# Patient Record
Sex: FEMALE | Race: BLACK OR AFRICAN AMERICAN | NOT HISPANIC OR LATINO | Employment: UNEMPLOYED | RURAL
[De-identification: names, ages, dates, MRNs, and addresses within clinical notes are randomized per-mention and may not be internally consistent; named-entity substitution may affect disease eponyms.]

---

## 2022-07-21 ENCOUNTER — INITIAL PRENATAL (OUTPATIENT)
Dept: OBSTETRICS AND GYNECOLOGY | Facility: CLINIC | Age: 27
End: 2022-07-21

## 2022-07-21 ENCOUNTER — LAB (OUTPATIENT)
Dept: LAB | Facility: HOSPITAL | Age: 27
End: 2022-07-21

## 2022-07-21 VITALS — SYSTOLIC BLOOD PRESSURE: 142 MMHG | WEIGHT: 164.8 LBS | DIASTOLIC BLOOD PRESSURE: 92 MMHG

## 2022-07-21 DIAGNOSIS — O16.9 ELEVATED BLOOD PRESSURE AFFECTING PREGNANCY, ANTEPARTUM: ICD-10-CM

## 2022-07-21 DIAGNOSIS — O09.33 INITIAL OBSTETRIC VISIT IN THIRD TRIMESTER: Primary | ICD-10-CM

## 2022-07-21 DIAGNOSIS — Z36.89 ENCOUNTER FOR OTHER SPECIFIED ANTENATAL SCREENING: ICD-10-CM

## 2022-07-21 DIAGNOSIS — Z34.00 SUPERVISION OF NORMAL FIRST PREGNANCY, ANTEPARTUM: ICD-10-CM

## 2022-07-21 DIAGNOSIS — Z78.9 DATE OF LAST MENSTRUAL PERIOD (LMP) UNKNOWN: ICD-10-CM

## 2022-07-21 DIAGNOSIS — Z34.00 SUPERVISION OF NORMAL FIRST PREGNANCY, ANTEPARTUM: Primary | ICD-10-CM

## 2022-07-21 PROCEDURE — 80306 DRUG TEST PRSMV INSTRMNT: CPT | Performed by: NURSE PRACTITIONER

## 2022-07-21 PROCEDURE — 87591 N.GONORRHOEAE DNA AMP PROB: CPT

## 2022-07-21 PROCEDURE — 82570 ASSAY OF URINE CREATININE: CPT | Performed by: NURSE PRACTITIONER

## 2022-07-21 PROCEDURE — 84156 ASSAY OF PROTEIN URINE: CPT | Performed by: NURSE PRACTITIONER

## 2022-07-21 PROCEDURE — 99204 OFFICE O/P NEW MOD 45 MIN: CPT | Performed by: NURSE PRACTITIONER

## 2022-07-21 PROCEDURE — 83036 HEMOGLOBIN GLYCOSYLATED A1C: CPT | Performed by: NURSE PRACTITIONER

## 2022-07-21 PROCEDURE — 80053 COMPREHEN METABOLIC PANEL: CPT | Performed by: NURSE PRACTITIONER

## 2022-07-21 PROCEDURE — 86803 HEPATITIS C AB TEST: CPT | Performed by: NURSE PRACTITIONER

## 2022-07-21 PROCEDURE — 87086 URINE CULTURE/COLONY COUNT: CPT | Performed by: NURSE PRACTITIONER

## 2022-07-21 PROCEDURE — 85007 BL SMEAR W/DIFF WBC COUNT: CPT | Performed by: NURSE PRACTITIONER

## 2022-07-21 PROCEDURE — 87491 CHLMYD TRACH DNA AMP PROBE: CPT

## 2022-07-21 PROCEDURE — 87661 TRICHOMONAS VAGINALIS AMPLIF: CPT

## 2022-07-21 PROCEDURE — 81001 URINALYSIS AUTO W/SCOPE: CPT | Performed by: NURSE PRACTITIONER

## 2022-07-21 PROCEDURE — 80081 OBSTETRIC PANEL INC HIV TSTG: CPT | Performed by: NURSE PRACTITIONER

## 2022-07-21 RX ORDER — MULTIPLE VITAMINS W/ MINERALS TAB 9MG-400MCG
1 TAB ORAL DAILY
COMMUNITY
End: 2022-10-24 | Stop reason: HOSPADM

## 2022-07-21 RX ORDER — PRENATAL VIT NO.126/IRON/FOLIC 28MG-0.8MG
TABLET ORAL DAILY
COMMUNITY
End: 2022-10-24 | Stop reason: HOSPADM

## 2022-07-21 NOTE — PROGRESS NOTES
"[facility]  Obstetrics  Date of Service: 2022    CHIEF COMPLAINT:  New prenatal visit    HISTORY OF PRESENT ILLNESS:  Alondra Perez is a 26 y.o. y/o  at 37w4d by LMP (Patient's last menstrual period was 10/31/2021 (approximate).).  This was a unplanned pregnancy and the patient is supported by Michael israel. Pt recently got of the [employer] and was stationed in [place]. States due date is  based on an early U/S. Believes LMP was at the end of 2021 [facility] [place]. Brought one piece of record, however, it is in Polish. Has not been seen for care since May 3rd. Reports she had 3 ultrasounds this pregnancy.  Reports that at her last US she was told she measuring \"small but not premature\" because pt is \"small.\"  Reports vomiting sometimes, feels food is stuck in her throat. C/o of swelling to ankles over the last few days. Denies breast tenderness.  She denies any vaginal bleeding.  She is taking a prenatal vitamin. B/P today 142/92, 142/90. Denies hx of HTN this pregnancy or CHTN. Did not complete glucola.     REVIEW OF SYSTEMS  Review of Systems   Constitutional: Negative for chills, fatigue, fever, unexpected weight gain and unexpected weight loss.   Respiratory: Negative for shortness of breath.    Cardiovascular: Negative for chest pain and palpitations.   Gastrointestinal: Positive for vomiting. Negative for abdominal pain, constipation, diarrhea and nausea.   Genitourinary: Negative for breast discharge, breast lump, breast pain, difficulty urinating, dysuria, frequency, urinary incontinence, vaginal bleeding, vaginal discharge and vaginal pain.   Skin: Negative for rash.   Neurological: Negative for weakness and headache.   Psychiatric/Behavioral: Negative for sleep disturbance, depressed mood and stress.       PRENATAL RISK FACTORS   Problems (from 22 to present)     Problem Noted Resolved    Supervision of normal first pregnancy, antepartum " "2022 by Tonya Larry APRN No    Overview Addendum 2022  2:48 PM by Tonya Larry APRN     Transfer at 37 weeks from [place]. No records available in English. Was in the [employer]. Last seen for care in May.    Baby Boy \"Ewing\"  Plans to breastfeed and formula. Has breast pump  Depo-Provera  FOB/Fiance: Michael  Did not do glucola.           Previous Version          DATING CRITERIA:  LMP: sometime at the end of 2021  1TUS: ?    Per pt,  EVARISTO is 2022 based on US.     OBSTETRIC HISTORY:  OB History    Para Term  AB Living   1             SAB IAB Ectopic Molar Multiple Live Births                    # Outcome Date GA Lbr Gil/2nd Weight Sex Delivery Anes PTL Lv   1 Current              GYN HISTORY:  Hx of chlamydia and gonorrhea in the past, not during this pregnancy.   Denies h/o abnormal pap smears  Last pap smear: per pt, 2021 normal-no records  Last Completed Pap Smear     This patient has no relevant Health Maintenance data.        Denies h/o gynecologic surgeries, including biopsies of the cervix    PAST MEDICAL HISTORY:  No past medical history on file.  PAST SURGICAL HISTORY:  No past surgical history on file.  FAMILY HISTORY:  No family history on file.  SOCIAL HISTORY:  Social History     Socioeconomic History   • Marital status: Legally      GENETIC SCREENING:  Age >36 yo as of EVARISTO: No  Thalassemia: No  NTD: No  CHD: No  Down Syndrome/MR/Fragile X/Autism: No  Ashkenazi Bahai with Ace-Sachs, [place], familial dysautonomia: No  Sickle cell disease or trait: No  Hemophilia: No  Muscular dystrophy: No  Cystic fibrosis: No  [place]'s chorea: No  Birth defects: No  Genetic/chromosomal disorders: No    INFECTION HISTORY:  TB exposure: No  HSV: No  Illness since LMP: No  Prior GBS infected child: No  STIs: Hx of chlamydia and gonorrhea in the distant past    ALLERGIES:  No Known Allergies    MEDICATIONS:  Prior to Admission medications    Medication " Sig Start Date End Date Taking? Authorizing Provider   doxylamine (UNISOM) 25 MG tablet Take  by mouth At Night As Needed for Sleep.   Yes Ministerio Silva MD   multivitamin with minerals (MULTIVITAMIN ADULTS PO) Take 1 tablet by mouth Daily.   Yes Ministerio Silva MD   prenatal vitamin (prenatal, CLASSIC, vitamin) tablet Take  by mouth Daily.   Yes Ministerio Silva MD   Pyridoxine HCl (VITAMIN B-6 PO) Take  by mouth.   Yes Ministerio Silva MD       PHYSICAL EXAM:   /92   Wt 74.8 kg (164 lb 12.8 oz)   LMP 10/31/2021 (Approximate)   General: Alert, healthy, no distress, well nourished and well developed.  Neurologic: Alert, oriented to person, place, and time.  Gait normal.  Cranial nerves II-XII grossly intact.  HEENT: Normocephalic, atraumatic.  Extraocular muscles intact, pupils equal and reactive x2.    Teeth: Normal hygiene.  Neck: Supple, no adenopathy, thyroid normal size, non-tender, without nodularity, trachea midline.    Lungs: Normal respiratory effort.  Clear to auscultation bilaterally.  No wheezes, rhonci, or rales.  Heart: Regular rate and rhythm.  No murmer, rub or gallop.  Abdomen: Soft, non-tender, non-distended,no masses, no hepatosplenomegaly, no hernia.  Skin: No rash, no lesions.  Extremities: No cyanosis, clubbing or edema.    Bedside ultrasound performed by myself which shows the findings below: single IUP, vertex via Vscan. FHr 140s bpm via doppler. Fundal height at 38 cm.       IMPRESSION:  Alondra Perez is a 26 y.o.  at 37w4d for a new prenatal visit.    PLAN:  1.  IUP at 37w4d per pt.  - Prenatal labs  and Pre-E labs ordered  - Growth Scan and B/P check tomorrow in [place]  - Continue prenatal vitamins  - Pre-E, labor and fetal kick count precautions     Diagnosis Plan   1. Initial obstetric visit in third trimester  OB Panel With HIV    Chlamydia trachomatis, Neisseria gonorrhoeae, PCR - Swab, Urine, Clean Catch    Urine Culture - Urine, Urine, Clean  Catch    Urine Drug Screen - Urine, Clean Catch    Urinalysis With Microscopic - Urine, Clean Catch    Protein, Urine, 24 Hour - Urine, Clean Catch    US Ob Follow Up Transabdominal Approach   2. Encounter for other specified  screening  Hemoglobin A1c   3. Elevated blood pressure affecting pregnancy, antepartum  Protein / Creatinine Ratio, Urine - Urine, Clean Catch    Comprehensive Metabolic Panel    Protein, Urine, 24 Hour - Urine, Clean Catch    US Ob Follow Up Transabdominal Approach   4. Date of last menstrual period (LMP) unknown  US Ob Follow Up Transabdominal Approach   5. Supervision of normal first pregnancy, antepartum       Tonya Lees, DREW  2022  14:48 CDT

## 2022-07-22 ENCOUNTER — HOSPITAL ENCOUNTER (OUTPATIENT)
Dept: ULTRASOUND IMAGING | Facility: HOSPITAL | Age: 27
End: 2022-07-22

## 2022-07-22 LAB
ABO GROUP BLD: NORMAL
AMPHET+METHAMPHET UR QL: NEGATIVE
AMPHETAMINES UR QL: NEGATIVE
BARBITURATES UR QL SCN: NEGATIVE
BENZODIAZ UR QL SCN: NEGATIVE
BLD GP AB SCN SERPL QL: NEGATIVE
BUPRENORPHINE SERPL-MCNC: NEGATIVE NG/ML
CANNABINOIDS SERPL QL: NEGATIVE
COCAINE UR QL: NEGATIVE
METHADONE UR QL SCN: NEGATIVE
OPIATES UR QL: NEGATIVE
OXYCODONE UR QL SCN: NEGATIVE
PCP UR QL SCN: NEGATIVE
PROPOXYPH UR QL: NEGATIVE
RH BLD: POSITIVE
TRICYCLICS UR QL SCN: NEGATIVE

## 2022-07-23 LAB
ALBUMIN SERPL-MCNC: 3.5 G/DL (ref 3.5–5.2)
ALBUMIN/GLOB SERPL: 1.3 G/DL
ALP SERPL-CCNC: 121 U/L (ref 39–117)
ALT SERPL W P-5'-P-CCNC: 16 U/L (ref 1–33)
ANION GAP SERPL CALCULATED.3IONS-SCNC: 14.8 MMOL/L (ref 5–15)
ANISOCYTOSIS BLD QL: ABNORMAL
AST SERPL-CCNC: 32 U/L (ref 1–32)
BACTERIA UR QL AUTO: ABNORMAL /HPF
BASOPHILS # BLD MANUAL: 0.07 10*3/MM3 (ref 0–0.2)
BASOPHILS NFR BLD MANUAL: 1 % (ref 0–1.5)
BILIRUB SERPL-MCNC: 0.3 MG/DL (ref 0–1.2)
BILIRUB UR QL STRIP: NEGATIVE
BUN SERPL-MCNC: 3 MG/DL (ref 6–20)
BUN/CREAT SERPL: 4.2 (ref 7–25)
C TRACH RRNA CVX QL NAA+PROBE: NEGATIVE
CALCIUM SPEC-SCNC: 8.7 MG/DL (ref 8.6–10.5)
CHLORIDE SERPL-SCNC: 102 MMOL/L (ref 98–107)
CLARITY UR: CLEAR
CO2 SERPL-SCNC: 20.2 MMOL/L (ref 22–29)
COLOR UR: YELLOW
CREAT SERPL-MCNC: 0.71 MG/DL (ref 0.57–1)
CREAT UR-MCNC: 63.5 MG/DL
DEPRECATED RDW RBC AUTO: 41.6 FL (ref 37–54)
EGFRCR SERPLBLD CKD-EPI 2021: 118.9 ML/MIN/1.73
EOSINOPHIL # BLD MANUAL: 0.14 10*3/MM3 (ref 0–0.4)
EOSINOPHIL NFR BLD MANUAL: 2 % (ref 0.3–6.2)
ERYTHROCYTE [DISTWIDTH] IN BLOOD BY AUTOMATED COUNT: 18.6 % (ref 12.3–15.4)
GLOBULIN UR ELPH-MCNC: 2.6 GM/DL
GLUCOSE SERPL-MCNC: 71 MG/DL (ref 65–99)
GLUCOSE UR STRIP-MCNC: NEGATIVE MG/DL
HBA1C MFR BLD: 4.8 % (ref 4.8–5.6)
HBV SURFACE AG SERPL QL IA: NORMAL
HCT VFR BLD AUTO: 27.1 % (ref 34–46.6)
HCV AB SER DONR QL: NORMAL
HGB BLD-MCNC: 8.5 G/DL (ref 12–15.9)
HGB UR QL STRIP.AUTO: NEGATIVE
HIV1+2 AB SER QL: NORMAL
HYALINE CASTS UR QL AUTO: ABNORMAL /LPF
KETONES UR QL STRIP: NEGATIVE
LEUKOCYTE ESTERASE UR QL STRIP.AUTO: ABNORMAL
LYMPHOCYTES # BLD MANUAL: 3.22 10*3/MM3 (ref 0.7–3.1)
LYMPHOCYTES NFR BLD MANUAL: 7 % (ref 5–12)
Lab: NORMAL
MCH RBC QN AUTO: 20.3 PG (ref 26.6–33)
MCHC RBC AUTO-ENTMCNC: 31.4 G/DL (ref 31.5–35.7)
MCV RBC AUTO: 64.7 FL (ref 79–97)
MICROCYTES BLD QL: ABNORMAL
MONOCYTES # BLD: 0.49 10*3/MM3 (ref 0.1–0.9)
N GONORRHOEA RRNA SPEC QL NAA+PROBE: NEGATIVE
NEUTROPHILS # BLD AUTO: 3.08 10*3/MM3 (ref 1.7–7)
NEUTROPHILS NFR BLD MANUAL: 44 % (ref 42.7–76)
NITRITE UR QL STRIP: NEGATIVE
PH UR STRIP.AUTO: 6 [PH] (ref 5–8)
PLAT MORPH BLD: NORMAL
PLATELET # BLD AUTO: 261 10*3/MM3 (ref 140–450)
POIKILOCYTOSIS BLD QL SMEAR: ABNORMAL
POTASSIUM SERPL-SCNC: 4.5 MMOL/L (ref 3.5–5.2)
PROT ?TM UR-MCNC: 19.2 MG/DL
PROT SERPL-MCNC: 6.1 G/DL (ref 6–8.5)
PROT UR QL STRIP: ABNORMAL
PROT/CREAT UR: 302.4 MG/G CREA (ref 0–200)
RBC # BLD AUTO: 4.19 10*6/MM3 (ref 3.77–5.28)
RBC # UR STRIP: ABNORMAL /HPF
REF LAB TEST METHOD: ABNORMAL
RPR SER QL: NORMAL
SODIUM SERPL-SCNC: 137 MMOL/L (ref 136–145)
SP GR UR STRIP: 1.01 (ref 1–1.03)
SQUAMOUS #/AREA URNS HPF: ABNORMAL /HPF
TARGETS BLD QL SMEAR: ABNORMAL
TRICHOMONAS VAGINALIS PCR: NEGATIVE
UROBILINOGEN UR QL STRIP: ABNORMAL
VARIANT LYMPHS NFR BLD MANUAL: 46 % (ref 19.6–45.3)
WBC # UR STRIP: ABNORMAL /HPF
WBC MORPH BLD: NORMAL
WBC NRBC COR # BLD: 7 10*3/MM3 (ref 3.4–10.8)

## 2022-07-24 LAB
BACTERIA SPEC AEROBE CULT: ABNORMAL
RUBV IGG SERPL IA-ACNC: 3.92 INDEX

## 2022-10-24 ENCOUNTER — TELEPHONE (OUTPATIENT)
Dept: OBSTETRICS AND GYNECOLOGY | Facility: CLINIC | Age: 27
End: 2022-10-24

## 2022-10-24 NOTE — TELEPHONE ENCOUNTER
Referrals in patient chart from in [place] one on 7/18 for Drew Arreguin is for pregnancy and up to 8 weeks postpartum and the 07/26 referral is for DREW Ballesteros

## 2023-02-17 ENCOUNTER — TELEPHONE (OUTPATIENT)
Dept: OBSTETRICS AND GYNECOLOGY | Facility: CLINIC | Age: 28
End: 2023-02-17
Payer: OTHER GOVERNMENT

## 2023-02-17 NOTE — TELEPHONE ENCOUNTER
Attempted to contact patient at [phone] VM not setup  She has an approved [facility] referral for maternity? Calling to see if she needs a new OB appointment?

## 2023-03-02 ENCOUNTER — LAB (OUTPATIENT)
Dept: LAB | Facility: HOSPITAL | Age: 28
End: 2023-03-02
Payer: OTHER GOVERNMENT

## 2023-03-02 ENCOUNTER — INITIAL PRENATAL (OUTPATIENT)
Dept: OBSTETRICS AND GYNECOLOGY | Facility: CLINIC | Age: 28
End: 2023-03-02
Payer: OTHER GOVERNMENT

## 2023-03-02 VITALS — WEIGHT: 146 LBS | SYSTOLIC BLOOD PRESSURE: 134 MMHG | DIASTOLIC BLOOD PRESSURE: 82 MMHG | BODY MASS INDEX: 25.86 KG/M2

## 2023-03-02 DIAGNOSIS — O36.80X0 ENCOUNTER TO DETERMINE FETAL VIABILITY OF PREGNANCY, SINGLE OR UNSPECIFIED FETUS: ICD-10-CM

## 2023-03-02 DIAGNOSIS — O09.892 SHORT INTERVAL BETWEEN PREGNANCIES AFFECTING PREGNANCY IN SECOND TRIMESTER, ANTEPARTUM: ICD-10-CM

## 2023-03-02 DIAGNOSIS — O21.9 NAUSEA AND VOMITING IN PREGNANCY PRIOR TO 22 WEEKS GESTATION: ICD-10-CM

## 2023-03-02 DIAGNOSIS — Z34.80 PRENATAL CARE OF MULTIGRAVIDA, ANTEPARTUM: ICD-10-CM

## 2023-03-02 DIAGNOSIS — Z98.890 HISTORY OF LOOP ELECTRICAL EXCISION PROCEDURE (LEEP): ICD-10-CM

## 2023-03-02 DIAGNOSIS — O09.32 INITIAL OBSTETRIC VISIT IN SECOND TRIMESTER: Primary | ICD-10-CM

## 2023-03-02 PROCEDURE — 87591 N.GONORRHOEAE DNA AMP PROB: CPT | Performed by: NURSE PRACTITIONER

## 2023-03-02 PROCEDURE — 86803 HEPATITIS C AB TEST: CPT | Performed by: NURSE PRACTITIONER

## 2023-03-02 PROCEDURE — 81001 URINALYSIS AUTO W/SCOPE: CPT | Performed by: NURSE PRACTITIONER

## 2023-03-02 PROCEDURE — 87491 CHLMYD TRACH DNA AMP PROBE: CPT | Performed by: NURSE PRACTITIONER

## 2023-03-02 PROCEDURE — 87086 URINE CULTURE/COLONY COUNT: CPT | Performed by: NURSE PRACTITIONER

## 2023-03-02 PROCEDURE — 87661 TRICHOMONAS VAGINALIS AMPLIF: CPT | Performed by: NURSE PRACTITIONER

## 2023-03-02 PROCEDURE — 80081 OBSTETRIC PANEL INC HIV TSTG: CPT | Performed by: NURSE PRACTITIONER

## 2023-03-02 PROCEDURE — 80306 DRUG TEST PRSMV INSTRMNT: CPT | Performed by: NURSE PRACTITIONER

## 2023-03-02 PROCEDURE — 0501F PRENATAL FLOW SHEET: CPT | Performed by: NURSE PRACTITIONER

## 2023-03-02 RX ORDER — METOCLOPRAMIDE 10 MG/1
TABLET ORAL
COMMUNITY
Start: 2023-02-25 | End: 2023-03-16

## 2023-03-02 RX ORDER — PROMETHAZINE HYDROCHLORIDE 25 MG/1
25 TABLET ORAL EVERY 6 HOURS PRN
Qty: 30 TABLET | Refills: 1 | Status: SHIPPED | OUTPATIENT
Start: 2023-03-02

## 2023-03-02 RX ORDER — METOCLOPRAMIDE 10 MG/1
TABLET ORAL
COMMUNITY
Start: 2023-02-25 | End: 2023-03-30

## 2023-03-02 RX ORDER — NITROFURANTOIN 25; 75 MG/1; MG/1
1 CAPSULE ORAL EVERY 12 HOURS SCHEDULED
COMMUNITY
Start: 2023-02-25 | End: 2023-03-16

## 2023-03-02 RX ORDER — ONDANSETRON 4 MG/1
4 TABLET, ORALLY DISINTEGRATING ORAL EVERY 8 HOURS PRN
COMMUNITY
Start: 2023-02-07 | End: 2023-03-30

## 2023-03-02 NOTE — PROGRESS NOTES
[facility]  Obstetrics  Date of Service: 2023    CHIEF COMPLAINT:  New prenatal visit    HISTORY OF PRESENT ILLNESS:  Alondra Perez is a 27 y.o. y/o  at 12w3d by LMP (Patient's last menstrual period was 2022 (exact date).).  This was a un planned pregnancy and the patient is supported by Michael LITTLEJOHN.  Reports nausea with vomiting. Reports breast tenderness.  She denies any vaginal bleeding.  She has started taking a prenatal vitamin. EPDS score 8/30.     REVIEW OF SYSTEMS  Review of Systems   Constitutional: Negative for chills, fatigue, fever, unexpected weight gain and unexpected weight loss.   Respiratory: Negative for shortness of breath.    Cardiovascular: Negative for chest pain and palpitations.   Gastrointestinal: Positive for nausea and vomiting. Negative for abdominal pain, constipation and diarrhea.   Genitourinary: Negative for breast discharge, breast lump, breast pain, difficulty urinating, dysuria, frequency, urinary incontinence, vaginal bleeding, vaginal discharge and vaginal pain.   Skin: Negative for rash.   Neurological: Negative for weakness and headache.   Psychiatric/Behavioral: Negative for sleep disturbance, depressed mood and stress.       PRENATAL RISK FACTORS  Pregnancy Problems (from 23 to present)     Problem Noted Resolved    Short interval between pregnancies affecting pregnancy in second trimester, antepartum 3/3/2023 by Tonya Larry APRN No    History of loop electrical excision procedure (LEEP) 3/3/2023 by Tonya Larry APRN No          DATING CRITERIA:  LMP (2022) -- EVARISTO 2023  1TUS-pending    OBSTETRIC HISTORY:  OB History    Para Term  AB Living   2 1 1     1   SAB IAB Ectopic Molar Multiple Live Births             1      # Outcome Date GA Lbr Gil/2nd Weight Sex Delivery Anes PTL Lv   2 Current            1 Term 22 37w5d  2750 g (6 lb 1 oz) M Vag-Spont EPI  SILVERIO     GYN HISTORY:  Hx of  chlamydia, gonorrhea  Denies h/o abnormal pap smears  Last pap smear:  2019, no records. Desires to complete pap postpartum.   Last Completed Pap Smear     This patient has no relevant Health Maintenance data.      Hx of LEEP in 2019, no follow-up pap test since.     PAST MEDICAL HISTORY:  Past Medical History:   Diagnosis Date   • Chlamydia    • Gonorrhea      PAST SURGICAL HISTORY:  Past Surgical History:   Procedure Laterality Date   • WISDOM TOOTH EXTRACTION       FAMILY HISTORY:  Family History   Problem Relation Age of Onset   • No Known Problems Mother    • No Known Problems Father      SOCIAL HISTORY:  Social History     Socioeconomic History   • Marital status: Legally    Tobacco Use   • Smoking status: Never   Substance and Sexual Activity   • Alcohol use: Never   • Drug use: Never     GENETIC SCREENING:  Age >36 yo as of EVARISTO: No  Thalassemia: No  NTD: No  CHD: No  Down Syndrome/MR/Fragile X/Autism: No  Ashkenazi Nondenominational with Ace-Sachs, [place], familial dysautonomia: No  Sickle cell disease or trait: Pt's a carrier  Hemophilia: No  Muscular dystrophy: No  Cystic fibrosis: No  [place]'s chorea: No  Birth defects: No  Genetic/chromosomal disorders: No    INFECTION HISTORY:  TB exposure: No  HSV: No  Illness since LMP: No  Prior GBS infected child: No  STIs: Hx of chlamydia, gonorrhea    ALLERGIES:  No Known Allergies    MEDICATIONS:  Prior to Admission medications    Medication Sig Start Date End Date Taking? Authorizing Provider   Doxylamine Succinate, Sleep, (UNISOM PO) Take  by mouth.   Yes Ministerio Silva MD   metoclopramide (REGLAN) 10 MG tablet TAKE 1 TABLET BY MOUTH THREE TIMES DAILY AS NEEDED FOR NAUSEA AND VOMITING 2/25/23  Yes Ministerio Silva MD   nitrofurantoin, macrocrystal-monohydrate, (MACROBID) 100 MG capsule Take 1 capsule by mouth Every 12 (Twelve) Hours. 2/25/23  Yes Ministerio Silva MD   Pyridoxine HCl (B-6 PO) Take  by mouth.   Yes Ministerio Silva MD    metoclopramide (REGLAN) 10 MG tablet Take  by mouth. 23   Ministerio Silva MD   ondansetron ODT (ZOFRAN-ODT) 4 MG disintegrating tablet 1 tablet Every 8 (Eight) Hours As Needed. 23   ProviderMinisterio MD       PHYSICAL EXAM:   /82   Wt 66.2 kg (146 lb)   LMP 2022 (Exact Date)   BMI 25.86 kg/m²   General: Alert, healthy, no distress, well nourished and well developed.  Neurologic: Alert, oriented to person, place, and time.  Gait normal.  Cranial nerves II-XII grossly intact.  HEENT: Normocephalic, atraumatic.  Extraocular muscles intact, pupils equal and reactive x2.    Teeth: Normal hygiene.  Neck: Supple, no adenopathy, thyroid normal size, non-tender, without nodularity, trachea midline.  Lungs: Normal respiratory effort.  Clear to auscultation bilaterally.  No wheezes, rhonci, or rales.  Heart: Regular rate and rhythm.  No murmer, rub or gallop.  Abdomen: Soft, non-tender, non-distended,no masses, no hepatosplenomegaly, no hernia.  Skin: No rash, no lesions.  Extremities: No cyanosis, clubbing or edema.    Bedside ultrasound performed by myself which shows the findings below: single IUP with a cardiac activity, appears consistent with LMP.     IMPRESSION:  Alondra Perez is a 27 y.o.  at 12w3d for a new prenatal visit.    PLAN:  1.  IUP at 12w3d  - Options counseling performed and patient desires continuation of pregnancy to term   - Prenatal labs ordered  - Genetic testing, including cystic fibrosis, was discussed and patient is interested.  - Continue prenatal vitamins  - Weight gain counseling performed.   - [place] BMI 18.5-24.9: Recommend 25-35 lb   - Return to clinic in 2  weeks for return prenatal visit and dating U/S  - Reviewed COVID-19 visitation policy  - Reviewed COVID-19 precautions     Diagnosis Plan   1. Initial obstetric visit in second trimester  US Ob < 14 Weeks Single or First Gestation      2. Prenatal care of multigravida, antepartum  OB Panel With  HIV    Urine Drug Screen - Urine, Clean Catch    Urine Culture - Urine, Urine, Clean Catch    Chlamydia trachomatis, Neisseria gonorrhoeae, Trichomonas vaginalis, PCR - Urine, Urine, Clean Catch    Urinalysis With Microscopic - Urine, Clean Catch    US Ob < 14 Weeks Single or First Gestation      3. Nausea and vomiting in pregnancy prior to 22 weeks gestation  Phenergan rx      4. Encounter to determine fetal viability of pregnancy, single or unspecified fetus  US Ob < 14 Weeks Single or First Gestation        Tonya Lees, DREW  3/3/2023  09:41 CST

## 2023-03-03 PROBLEM — Z98.890 HISTORY OF LOOP ELECTRICAL EXCISION PROCEDURE (LEEP): Status: ACTIVE | Noted: 2023-03-03

## 2023-03-03 PROBLEM — O09.892 SHORT INTERVAL BETWEEN PREGNANCIES AFFECTING PREGNANCY IN SECOND TRIMESTER, ANTEPARTUM: Status: ACTIVE | Noted: 2023-03-03

## 2023-03-03 LAB
ABO GROUP BLD: NORMAL
AMPHET+METHAMPHET UR QL: NEGATIVE
AMPHETAMINES UR QL: NEGATIVE
BACTERIA UR QL AUTO: ABNORMAL /HPF
BARBITURATES UR QL SCN: NEGATIVE
BASOPHILS # BLD AUTO: 0.02 10*3/MM3 (ref 0–0.2)
BASOPHILS NFR BLD AUTO: 0.3 % (ref 0–1.5)
BENZODIAZ UR QL SCN: NEGATIVE
BILIRUB UR QL STRIP: NEGATIVE
BLD GP AB SCN SERPL QL: NEGATIVE
BUPRENORPHINE SERPL-MCNC: NEGATIVE NG/ML
CANNABINOIDS SERPL QL: NEGATIVE
CLARITY UR: CLEAR
COCAINE UR QL: NEGATIVE
COLOR UR: YELLOW
DEPRECATED RDW RBC AUTO: 48.3 FL (ref 37–54)
EOSINOPHIL # BLD AUTO: 0.12 10*3/MM3 (ref 0–0.4)
EOSINOPHIL NFR BLD AUTO: 1.6 % (ref 0.3–6.2)
ERYTHROCYTE [DISTWIDTH] IN BLOOD BY AUTOMATED COUNT: 19.6 % (ref 12.3–15.4)
GLUCOSE UR STRIP-MCNC: NEGATIVE MG/DL
HBV SURFACE AG SERPL QL IA: NORMAL
HCT VFR BLD AUTO: 32.1 % (ref 34–46.6)
HCV AB SER DONR QL: NORMAL
HGB BLD-MCNC: 10.4 G/DL (ref 12–15.9)
HGB UR QL STRIP.AUTO: NEGATIVE
HIV1+2 AB SER QL: NORMAL
HYALINE CASTS UR QL AUTO: ABNORMAL /LPF
IMM GRANULOCYTES # BLD AUTO: 0.04 10*3/MM3 (ref 0–0.05)
IMM GRANULOCYTES NFR BLD AUTO: 0.5 % (ref 0–0.5)
KETONES UR QL STRIP: NEGATIVE
LEUKOCYTE ESTERASE UR QL STRIP.AUTO: NEGATIVE
LYMPHOCYTES # BLD AUTO: 1.83 10*3/MM3 (ref 0.7–3.1)
LYMPHOCYTES NFR BLD AUTO: 24.8 % (ref 19.6–45.3)
Lab: NORMAL
MCH RBC QN AUTO: 22.5 PG (ref 26.6–33)
MCHC RBC AUTO-ENTMCNC: 32.4 G/DL (ref 31.5–35.7)
MCV RBC AUTO: 69.3 FL (ref 79–97)
METHADONE UR QL SCN: NEGATIVE
MONOCYTES # BLD AUTO: 0.54 10*3/MM3 (ref 0.1–0.9)
MONOCYTES NFR BLD AUTO: 7.3 % (ref 5–12)
NEUTROPHILS NFR BLD AUTO: 4.82 10*3/MM3 (ref 1.7–7)
NEUTROPHILS NFR BLD AUTO: 65.5 % (ref 42.7–76)
NITRITE UR QL STRIP: NEGATIVE
NRBC BLD AUTO-RTO: 0 /100 WBC (ref 0–0.2)
OPIATES UR QL: NEGATIVE
OXYCODONE UR QL SCN: NEGATIVE
PCP UR QL SCN: NEGATIVE
PH UR STRIP.AUTO: 6 [PH] (ref 5–8)
PLATELET # BLD AUTO: 225 10*3/MM3 (ref 140–450)
PMV BLD AUTO: 11.6 FL (ref 6–12)
PROPOXYPH UR QL: NEGATIVE
PROT UR QL STRIP: NEGATIVE
RBC # BLD AUTO: 4.63 10*6/MM3 (ref 3.77–5.28)
RBC # UR STRIP: ABNORMAL /HPF
REF LAB TEST METHOD: ABNORMAL
RH BLD: POSITIVE
SP GR UR STRIP: 1.02 (ref 1–1.03)
SQUAMOUS #/AREA URNS HPF: ABNORMAL /HPF
TRICYCLICS UR QL SCN: NEGATIVE
UROBILINOGEN UR QL STRIP: NORMAL
WBC # UR STRIP: ABNORMAL /HPF
WBC NRBC COR # BLD: 7.37 10*3/MM3 (ref 3.4–10.8)

## 2023-03-04 LAB
BACTERIA SPEC AEROBE CULT: NO GROWTH
C TRACH RRNA CVX QL NAA+PROBE: NEGATIVE
N GONORRHOEA RRNA SPEC QL NAA+PROBE: NEGATIVE
RPR SER QL: NORMAL
RUBV IGG SERPL IA-ACNC: 4.04 INDEX
TRICHOMONAS VAGINALIS PCR: NEGATIVE

## 2023-03-06 RX ORDER — FERROUS SULFATE 325(65) MG
325 TABLET ORAL
Qty: 30 TABLET | Refills: 5 | Status: SHIPPED | OUTPATIENT
Start: 2023-03-06 | End: 2023-03-30

## 2023-03-16 ENCOUNTER — ROUTINE PRENATAL (OUTPATIENT)
Dept: OBSTETRICS AND GYNECOLOGY | Facility: CLINIC | Age: 28
End: 2023-03-16
Payer: OTHER GOVERNMENT

## 2023-03-16 VITALS — WEIGHT: 153 LBS | DIASTOLIC BLOOD PRESSURE: 80 MMHG | SYSTOLIC BLOOD PRESSURE: 120 MMHG | BODY MASS INDEX: 27.1 KG/M2

## 2023-03-16 DIAGNOSIS — Z3A.14 14 WEEKS GESTATION OF PREGNANCY: Primary | ICD-10-CM

## 2023-03-16 DIAGNOSIS — Z98.890 HISTORY OF LOOP ELECTRICAL EXCISION PROCEDURE (LEEP): ICD-10-CM

## 2023-03-16 DIAGNOSIS — O09.892 SHORT INTERVAL BETWEEN PREGNANCIES AFFECTING PREGNANCY IN SECOND TRIMESTER, ANTEPARTUM: ICD-10-CM

## 2023-03-16 DIAGNOSIS — O99.019 MATERNAL ANEMIA IN PREGNANCY, ANTEPARTUM: ICD-10-CM

## 2023-03-16 DIAGNOSIS — Z36.89 ENCOUNTER FOR FETAL ANATOMIC SURVEY: ICD-10-CM

## 2023-03-16 PROCEDURE — 0502F SUBSEQUENT PRENATAL CARE: CPT | Performed by: NURSE PRACTITIONER

## 2023-03-16 NOTE — PROGRESS NOTES
CC: Prenatal visit    Alondra Perez is a 27 y.o.  at 14w3d.  Doing well.  Has nausea and taking phenergan. Denies dysuria, abnormal vaginal d/c, headaches, heartburn, constipation, cramping, regular contractions, LOF, or VB.      /80   Wt 69.4 kg (153 lb)   LMP 2022 (Exact Date)   BMI 27.10 kg/m²   SVE: Deferred     Fetal Heart Rate: 151 us     US: single IUP at 14w3d with final EVARISTO of 2023 by LMP=US.     Pregnancy Problems (from 23 to present)     Problem Noted Resolved    Maternal anemia in pregnancy, antepartum 3/16/2023 by Tonya Larry APRN No    Short interval between pregnancies affecting pregnancy in second trimester, antepartum 3/3/2023 by Tonya Larry APRN No    History of loop electrical excision procedure (LEEP) 3/3/2023 by Tonya Larry APRN No          A/P: Alondra Perez is a 27 y.o.  at 14w3d.  - Reviewed Dating US  - Desires NIPT, but we are out of kits. Call pt when kits are available. Verify if she desires carrier screening as well.   - RTC in 5 weeks for anatomy US and OB appt     Diagnosis Plan   1. 14 weeks gestation of pregnancy        2. Short interval between pregnancies affecting pregnancy in second trimester, antepartum  US Ob 14 + Weeks Single or First Gestation      3. History of loop electrical excision procedure (LEEP)  US Ob 14 + Weeks Single or First Gestation      4. Encounter for fetal anatomic survey  US Ob 14 + Weeks Single or First Gestation      5. Maternal anemia in pregnancy, antepartum          DREW Perry  3/16/2023  11:51 CDT

## 2023-03-21 ENCOUNTER — TELEPHONE (OUTPATIENT)
Dept: OBSTETRICS AND GYNECOLOGY | Facility: CLINIC | Age: 28
End: 2023-03-21
Payer: OTHER GOVERNMENT

## 2023-03-21 DIAGNOSIS — Z98.890 HISTORY OF LOOP ELECTRICAL EXCISION PROCEDURE (LEEP): ICD-10-CM

## 2023-03-21 DIAGNOSIS — Z36.89 ENCOUNTER FOR FETAL ANATOMIC SURVEY: ICD-10-CM

## 2023-03-21 DIAGNOSIS — O09.892 SHORT INTERVAL BETWEEN PREGNANCIES AFFECTING PREGNANCY IN SECOND TRIMESTER, ANTEPARTUM: ICD-10-CM

## 2023-03-21 NOTE — TELEPHONE ENCOUNTER
Tonya Larry APRN sent to Jessica Farah MA  Can we call her when we have Mariana kits? I know she wants NIPT, but unsure of carrier screening. Ask when she comes in.           Comments    Maria Del Carmen Murphy MA 3/21/2023 10:45 AM CDT        ------------------------------------    CALLED AND INFORMED THE PT THAT WE NOW HAVE THE MARIANA KITS AND THAT SHE MAY COME AT HER CONVENIENCE ON Tuesday OR Thursday TO [place] TO PICK ONE UP TO TAKE TO THE LAB DOWNSTAIRS.  PT VERBALIZED UNDERSTANDING.

## 2023-03-23 ENCOUNTER — LAB (OUTPATIENT)
Dept: LAB | Facility: HOSPITAL | Age: 28
End: 2023-03-23
Payer: OTHER GOVERNMENT

## 2023-03-23 DIAGNOSIS — Z13.79 GENETIC TESTING: Primary | ICD-10-CM

## 2023-03-23 DIAGNOSIS — Z14.8 GENETIC CARRIER: Primary | ICD-10-CM

## 2023-03-31 ENCOUNTER — TELEPHONE (OUTPATIENT)
Dept: OBSTETRICS AND GYNECOLOGY | Facility: CLINIC | Age: 28
End: 2023-03-31
Payer: OTHER GOVERNMENT

## 2023-04-27 ENCOUNTER — ROUTINE PRENATAL (OUTPATIENT)
Dept: OBSTETRICS AND GYNECOLOGY | Facility: CLINIC | Age: 28
End: 2023-04-27
Payer: OTHER GOVERNMENT

## 2023-04-27 VITALS — BODY MASS INDEX: 28.48 KG/M2 | SYSTOLIC BLOOD PRESSURE: 118 MMHG | WEIGHT: 160.8 LBS | DIASTOLIC BLOOD PRESSURE: 72 MMHG

## 2023-04-27 DIAGNOSIS — O09.892 SHORT INTERVAL BETWEEN PREGNANCIES AFFECTING PREGNANCY IN SECOND TRIMESTER, ANTEPARTUM: Primary | ICD-10-CM

## 2023-04-27 DIAGNOSIS — O99.019 MATERNAL ANEMIA IN PREGNANCY, ANTEPARTUM: ICD-10-CM

## 2023-04-27 DIAGNOSIS — Z36.2 ENCOUNTER FOR FOLLOW-UP ULTRASOUND OF FETAL ANATOMY: ICD-10-CM

## 2023-04-27 DIAGNOSIS — Z34.82 ENCOUNTER FOR SUPERVISION OF OTHER NORMAL PREGNANCY IN SECOND TRIMESTER: ICD-10-CM

## 2023-04-27 DIAGNOSIS — Z98.890 HISTORY OF LOOP ELECTRICAL EXCISION PROCEDURE (LEEP): ICD-10-CM

## 2023-04-27 DIAGNOSIS — O35.EXX0 RENAL ABNORMALITY OF FETUS ON PRENATAL ULTRASOUND: ICD-10-CM

## 2023-04-27 DIAGNOSIS — Z3A.20 20 WEEKS GESTATION OF PREGNANCY: ICD-10-CM

## 2023-04-27 PROBLEM — Z34.90 SUPERVISION OF NORMAL PREGNANCY: Status: ACTIVE | Noted: 2023-04-27

## 2023-04-27 NOTE — PROGRESS NOTES
CC: Prenatal visit    Alondra Perez is a 27 y.o.  at 20w3d.  Doing well.  Denies N/V, dysuria, abnormal vaginal d/c, headaches, heartburn, constipation, cramping, regular contractions, LOF, or VB.  Reports good FM. Taking phenergan sparingly for nausea.     /72   Wt 72.9 kg (160 lb 12.8 oz)   LMP 2022 (Exact Date)   BMI 28.48 kg/m²   SVE: Deferred     Fetal Heart Rate: 143 us     US: Breech. Anterior placenta, no previa. Suboptimal view of the P. Cord insertion. EFW 12 oz. Multiple suboptimal views remain. Lt kidney pelviectasis. 3VC. Male fetus. CL 3.67cm.     Pregnancy Problems (from 23 to present)     Problem Noted Resolved    Renal abnormality of fetus on prenatal ultrasound 2023 by Tonya Larry APRN No    Overview Signed 2023 11:42 AM by Tonya Larry APRN     Lt kidney pelviectasis on          Supervision of normal pregnancy 2023 by Tonya Larry APRN No    Overview Signed 2023 12:55 PM by Tonya Larry APRN     Low risk NIPT, male fetus         Maternal care for breech presentation, single gestation 2023 by Tonya Larry APRN No    Maternal anemia in pregnancy, antepartum 3/16/2023 by Tonya Larry APRN No    Short interval between pregnancies affecting pregnancy in second trimester, antepartum 3/3/2023 by Tonya Larry APRN No    History of loop electrical excision procedure (LEEP) 3/3/2023 by Tonya Larry APRN No          A/P: Alondra Perez is a 27 y.o.  at 20w3d.  - US for subopts on May 18  - Reviewed low risk NIPT, male fetus     Diagnosis Plan   1. Short interval between pregnancies affecting pregnancy in second trimester, antepartum  US Ob Follow Up Transabdominal Approach      2. History of loop electrical excision procedure (LEEP)  US Ob Follow Up Transabdominal Approach      3. Maternal anemia in pregnancy, antepartum  US Ob Follow Up Transabdominal Approach      4. Encounter for  follow-up ultrasound of fetal anatomy  US Ob Follow Up Transabdominal Approach      5. Encounter for supervision of other normal pregnancy in second trimester  US Ob Follow Up Transabdominal Approach      6. 20 weeks gestation of pregnancy        7. Renal abnormality of fetus on prenatal ultrasound        8. Maternal care for breech presentation, single gestation          Tonya Kendy, APRN  4/27/2023  13:00 CDT

## 2023-05-02 DIAGNOSIS — O09.892 SHORT INTERVAL BETWEEN PREGNANCIES AFFECTING PREGNANCY IN SECOND TRIMESTER, ANTEPARTUM: ICD-10-CM

## 2023-05-02 DIAGNOSIS — O99.019 MATERNAL ANEMIA IN PREGNANCY, ANTEPARTUM: ICD-10-CM

## 2023-05-02 DIAGNOSIS — Z98.890 HISTORY OF LOOP ELECTRICAL EXCISION PROCEDURE (LEEP): ICD-10-CM

## 2023-05-02 DIAGNOSIS — Z36.2 ENCOUNTER FOR FOLLOW-UP ULTRASOUND OF FETAL ANATOMY: ICD-10-CM

## 2023-05-02 DIAGNOSIS — Z34.82 ENCOUNTER FOR SUPERVISION OF OTHER NORMAL PREGNANCY IN SECOND TRIMESTER: ICD-10-CM

## 2023-05-03 ENCOUNTER — TELEPHONE (OUTPATIENT)
Dept: OBSTETRICS AND GYNECOLOGY | Facility: CLINIC | Age: 28
End: 2023-05-03

## 2023-05-04 RX ORDER — PROMETHAZINE HYDROCHLORIDE 25 MG/1
25 TABLET ORAL EVERY 6 HOURS PRN
Qty: 30 TABLET | Refills: 1 | Status: SHIPPED | OUTPATIENT
Start: 2023-05-04

## 2023-05-15 ENCOUNTER — TELEPHONE (OUTPATIENT)
Dept: OBSTETRICS AND GYNECOLOGY | Facility: CLINIC | Age: 28
End: 2023-05-15

## 2023-05-15 NOTE — TELEPHONE ENCOUNTER
PATIENT CALLED AND IS NEEDING A REFILL ON HER promethazine (PHENERGAN) 25 MG tablet    SENT TO [facility] IN [place]. IF QUESTIONS HER NUMBER [phone].        THANKS,        ARIADNA

## 2023-05-16 ENCOUNTER — TELEPHONE (OUTPATIENT)
Dept: OBSTETRICS AND GYNECOLOGY | Facility: CLINIC | Age: 28
End: 2023-05-16
Payer: OTHER GOVERNMENT

## 2023-05-16 RX ORDER — PROMETHAZINE HYDROCHLORIDE 25 MG/1
25 TABLET ORAL EVERY 6 HOURS PRN
Qty: 30 TABLET | Refills: 1 | Status: SHIPPED | OUTPATIENT
Start: 2023-05-16

## 2023-05-16 NOTE — TELEPHONE ENCOUNTER
A REP from patients insurance called and said patient went to get script from [facility] and they are saying they did not get it..I told her that its in the system where we sent it and told her that you would send it again... please...

## 2023-05-16 NOTE — TELEPHONE ENCOUNTER
Attempted to contact patient there was no answer.  Spoke with [facility] pharmacy they needed patient contact number for her profile in their system and they will no be filling the phenergan.

## 2023-05-18 ENCOUNTER — ROUTINE PRENATAL (OUTPATIENT)
Dept: OBSTETRICS AND GYNECOLOGY | Facility: CLINIC | Age: 28
End: 2023-05-18
Payer: OTHER GOVERNMENT

## 2023-05-18 VITALS — WEIGHT: 164 LBS | SYSTOLIC BLOOD PRESSURE: 124 MMHG | BODY MASS INDEX: 29.05 KG/M2 | DIASTOLIC BLOOD PRESSURE: 68 MMHG

## 2023-05-18 DIAGNOSIS — Z34.82 ENCOUNTER FOR SUPERVISION OF OTHER NORMAL PREGNANCY IN SECOND TRIMESTER: ICD-10-CM

## 2023-05-18 DIAGNOSIS — Z3A.23 23 WEEKS GESTATION OF PREGNANCY: Primary | ICD-10-CM

## 2023-05-18 DIAGNOSIS — O35.EXX0 RENAL ABNORMALITY OF FETUS ON PRENATAL ULTRASOUND: ICD-10-CM

## 2023-05-18 DIAGNOSIS — O09.892 SHORT INTERVAL BETWEEN PREGNANCIES AFFECTING PREGNANCY IN SECOND TRIMESTER, ANTEPARTUM: ICD-10-CM

## 2023-05-18 DIAGNOSIS — Z36.2 ENCOUNTER FOR FOLLOW-UP ULTRASOUND OF FETAL ANATOMY: ICD-10-CM

## 2023-05-18 DIAGNOSIS — O21.9 NAUSEA AND VOMITING DURING PREGNANCY: ICD-10-CM

## 2023-05-18 NOTE — PROGRESS NOTES
CC: Prenatal visit    Alondra Perez is a 27 y.o.  at 23w3d.  Doing well.  Denies N/V, dysuria, abnormal vaginal d/c, headaches, heartburn, constipation, cramping, regular contractions, LOF, or VB.  Reports good FM.    /68   Wt 74.4 kg (164 lb)   LMP 2022 (Exact Date)   BMI 29.05 kg/m²   SVE: Deferred     Fetal Heart Rate: 145 us     Finalized US for subopts: Breech. Anterior placenta.  gm (1lb 2oz), Suboptimal views of the spine, and ventral wall remains. Left kidney contains multiple noncommunicating cysts. 2 larger cysts (largest 14.7mm) and at least one smaller cyst was seen. Right kidney appears normal.     Pregnancy Problems (from 23 to present)     Problem Noted Resolved    Renal abnormality of fetus on prenatal ultrasound 2023 by Tonya Larry APRN No    Overview Addendum 2023 11:54 AM by Tonya Larry APRN     Lt kidney pelviectasis on : Left kidney contained multiple noncommunicating cysts. 2 larger cysts (largest 14.7mm) and at least one smaller cyst was seen. Repeat US in 3 weeks for subopts and evaluation of kidney.          Supervision of normal pregnancy 2023 by Tonya Larry APRN No    Overview Signed 2023 12:55 PM by Tonya Larry APRN     Low risk NIPT, male fetus         Maternal care for breech presentation, single gestation 2023 by Tonya Larry APRN No    Maternal anemia in pregnancy, antepartum 3/16/2023 by Tonya Larry APRN No    Short interval between pregnancies affecting pregnancy in second trimester, antepartum 3/3/2023 by Tonya Larry APRN No    History of loop electrical excision procedure (LEEP) 3/3/2023 by Tonya Larry APRN No          A/P: Alondra Perez is a 27 y.o.  at 23w3d.  - Reviewed preliminary report with patient at time of visit. Discussed possible MFM consultation.  - Finalized report recommends US for subopts in 3 weeks. Requested MFM referral  with [facility] and was told consultation not indicated at this time. Consulted with Dr. Palma who recommends MFM referral if cysts are getting larger with next U/S. Called patient and discuss this plan.  - RTC 6/8/2023 with US with [facility]       Diagnosis Plan   1. 23 weeks gestation of pregnancy        2. Renal abnormality of fetus on prenatal ultrasound  US Ob Follow Up Transabdominal Approach      3. Encounter for supervision of other normal pregnancy in second trimester  US Ob Follow Up Transabdominal Approach      4. Short interval between pregnancies affecting pregnancy in second trimester, antepartum  US Ob Follow Up Transabdominal Approach      5. Maternal care for breech presentation, single gestation  US Ob Follow Up Transabdominal Approach      6. Encounter for follow-up ultrasound of fetal anatomy  US Ob Follow Up Transabdominal Approach      7. Nausea and vomiting during pregnancy  ondansetron (Zofran) 4 MG tablet        DREW Arreguin  5/19/2023  12:08 CDT

## 2023-05-19 RX ORDER — ONDANSETRON 4 MG/1
4 TABLET, FILM COATED ORAL EVERY 8 HOURS PRN
Qty: 30 TABLET | Refills: 1 | Status: SHIPPED | OUTPATIENT
Start: 2023-05-19 | End: 2024-05-18

## 2023-05-22 DIAGNOSIS — O35.EXX0 RENAL ABNORMALITY OF FETUS ON PRENATAL ULTRASOUND: ICD-10-CM

## 2023-05-22 DIAGNOSIS — Z34.82 ENCOUNTER FOR SUPERVISION OF OTHER NORMAL PREGNANCY IN SECOND TRIMESTER: ICD-10-CM

## 2023-05-22 DIAGNOSIS — O09.892 SHORT INTERVAL BETWEEN PREGNANCIES AFFECTING PREGNANCY IN SECOND TRIMESTER, ANTEPARTUM: ICD-10-CM

## 2023-05-22 DIAGNOSIS — Z36.2 ENCOUNTER FOR FOLLOW-UP ULTRASOUND OF FETAL ANATOMY: ICD-10-CM

## 2023-06-08 ENCOUNTER — ROUTINE PRENATAL (OUTPATIENT)
Dept: OBSTETRICS AND GYNECOLOGY | Facility: CLINIC | Age: 28
End: 2023-06-08
Payer: OTHER GOVERNMENT

## 2023-06-08 VITALS — WEIGHT: 168 LBS | BODY MASS INDEX: 29.76 KG/M2 | DIASTOLIC BLOOD PRESSURE: 72 MMHG | SYSTOLIC BLOOD PRESSURE: 120 MMHG

## 2023-06-08 DIAGNOSIS — O09.892 SHORT INTERVAL BETWEEN PREGNANCIES AFFECTING PREGNANCY IN SECOND TRIMESTER, ANTEPARTUM: ICD-10-CM

## 2023-06-08 DIAGNOSIS — Z36.2 ENCOUNTER FOR FOLLOW-UP ULTRASOUND OF FETAL ANATOMY: ICD-10-CM

## 2023-06-08 DIAGNOSIS — Z36.89 ENCOUNTER FOR OTHER SPECIFIED ANTENATAL SCREENING: ICD-10-CM

## 2023-06-08 DIAGNOSIS — Z34.82 ENCOUNTER FOR SUPERVISION OF OTHER NORMAL PREGNANCY IN SECOND TRIMESTER: ICD-10-CM

## 2023-06-08 DIAGNOSIS — O35.EXX0 RENAL ABNORMALITY OF FETUS ON PRENATAL ULTRASOUND: Primary | ICD-10-CM

## 2023-06-08 DIAGNOSIS — O99.019 MATERNAL ANEMIA IN PREGNANCY, ANTEPARTUM: ICD-10-CM

## 2023-06-08 DIAGNOSIS — Z3A.26 26 WEEKS GESTATION OF PREGNANCY: ICD-10-CM

## 2023-06-08 DIAGNOSIS — Z98.890 HISTORY OF LOOP ELECTRICAL EXCISION PROCEDURE (LEEP): ICD-10-CM

## 2023-06-08 NOTE — PROGRESS NOTES
CC: Prenatal visit    Alondra Perez is a 27 y.o.  at 26w3d.  Doing well.  Denies N/V, dysuria, abnormal vaginal d/c, headaches, heartburn, constipation, cramping, regular contractions, LOF, or VB.  Reports good FM. Pt is anemic but has no been taking her iron pills.     /72   Wt 76.2 kg (168 lb)   LMP 2022 (Exact Date)   BMI 29.76 kg/m²        Fetal Heart Rate: 145 usPreliminary US: Cephalic. SEBASTIAN 12.8cm.  gm (1lb 13oz), 10%ile. AC 22%, BPD 4%, HC 8%, FL 6%. Suboptimal view of the ventral wall. Left kidney only now with a small single cyst. Other anatomic views are normal in appearance.     Pregnancy Problems (from 23 to present)       Problem Noted Resolved    Renal abnormality of fetus on prenatal ultrasound 2023 by Tonya Larry APRN No    Overview Addendum 2023  1:07 PM by Tonya Larry APRN     Lt kidney pelviectasis on : Left kidney contained multiple noncommunicating cysts. 2 larger cysts (largest 14.7mm) and at least one smaller cyst was seen. Repeat US in 3 weeks for subopts and evaluation of kidney.     : preliminary US: small single cyst of the left kidney.          Supervision of normal pregnancy 2023 by Tonya Larry APRN No    Overview Signed 2023 12:55 PM by Tonya Larry APRN     Low risk NIPT, male fetus         Maternal care for breech presentation, single gestation 2023 by Tonya Larry APRN No    Maternal anemia in pregnancy, antepartum 3/16/2023 by Tonya Larry APRN No    Short interval between pregnancies affecting pregnancy in second trimester, antepartum 3/3/2023 by Tonya Larry APRN No    History of loop electrical excision procedure (LEEP) 3/3/2023 by Tonya Larry APRN No            A/P: Alondra Perez is a 27 y.o.  at 26w3d.  - Instructed pt to complete 3T labs ASAP and before next appt.   -  Reviewed EFW and AC is normal. Continue to monitor growth. US for  subopts with [facility] on  and OB appt with me after.      Diagnosis Plan   1. Renal abnormality of fetus on prenatal ultrasound  US Ob Follow Up Transabdominal Approach      2. Encounter for supervision of other normal pregnancy in second trimester  US Ob Follow Up Transabdominal Approach      3. Maternal anemia in pregnancy, antepartum        4. Short interval between pregnancies affecting pregnancy in second trimester, antepartum  US Ob Follow Up Transabdominal Approach      5. History of loop electrical excision procedure (LEEP)        6. Encounter for other specified  screening  CBC (No Diff)    Glucose, Post 50 Gm Glucola      7. Encounter for follow-up ultrasound of fetal anatomy  US Ob Follow Up Transabdominal Approach      8. 26 weeks gestation of pregnancy          Tonya Larry, APRN  2023  13:07 CDT

## 2023-06-13 DIAGNOSIS — Z34.82 ENCOUNTER FOR SUPERVISION OF OTHER NORMAL PREGNANCY IN SECOND TRIMESTER: ICD-10-CM

## 2023-06-13 DIAGNOSIS — O35.EXX0 RENAL ABNORMALITY OF FETUS ON PRENATAL ULTRASOUND: ICD-10-CM

## 2023-06-13 DIAGNOSIS — Z36.2 ENCOUNTER FOR FOLLOW-UP ULTRASOUND OF FETAL ANATOMY: ICD-10-CM

## 2023-06-13 DIAGNOSIS — O09.892 SHORT INTERVAL BETWEEN PREGNANCIES AFFECTING PREGNANCY IN SECOND TRIMESTER, ANTEPARTUM: ICD-10-CM

## 2023-07-20 PROBLEM — O21.9 NAUSEA AND VOMITING IN PREGNANCY PRIOR TO 22 WEEKS GESTATION: Status: RESOLVED | Noted: 2023-03-02 | Resolved: 2023-07-20

## 2023-07-24 DIAGNOSIS — O99.019 MATERNAL ANEMIA IN PREGNANCY, ANTEPARTUM: ICD-10-CM

## 2023-07-24 DIAGNOSIS — Z34.82 ENCOUNTER FOR SUPERVISION OF OTHER NORMAL PREGNANCY IN SECOND TRIMESTER: ICD-10-CM

## 2023-07-24 DIAGNOSIS — O09.892 SHORT INTERVAL BETWEEN PREGNANCIES AFFECTING PREGNANCY IN SECOND TRIMESTER, ANTEPARTUM: ICD-10-CM

## 2023-07-24 DIAGNOSIS — Z98.890 HISTORY OF LOOP ELECTRICAL EXCISION PROCEDURE (LEEP): ICD-10-CM

## 2023-08-09 ENCOUNTER — ROUTINE PRENATAL (OUTPATIENT)
Dept: OBSTETRICS AND GYNECOLOGY | Facility: CLINIC | Age: 28
End: 2023-08-09
Payer: OTHER GOVERNMENT

## 2023-08-09 VITALS — DIASTOLIC BLOOD PRESSURE: 68 MMHG | SYSTOLIC BLOOD PRESSURE: 132 MMHG | WEIGHT: 182.2 LBS | BODY MASS INDEX: 32.28 KG/M2

## 2023-08-09 DIAGNOSIS — O09.892 SHORT INTERVAL BETWEEN PREGNANCIES AFFECTING PREGNANCY IN SECOND TRIMESTER, ANTEPARTUM: ICD-10-CM

## 2023-08-09 DIAGNOSIS — Z34.82 ENCOUNTER FOR SUPERVISION OF OTHER NORMAL PREGNANCY IN SECOND TRIMESTER: ICD-10-CM

## 2023-08-09 DIAGNOSIS — O99.019 MATERNAL ANEMIA IN PREGNANCY, ANTEPARTUM: ICD-10-CM

## 2023-08-09 DIAGNOSIS — O35.EXX0 RENAL ABNORMALITY OF FETUS ON PRENATAL ULTRASOUND: Primary | ICD-10-CM

## 2023-08-09 DIAGNOSIS — Z98.890 HISTORY OF LOOP ELECTRICAL EXCISION PROCEDURE (LEEP): ICD-10-CM

## 2023-08-09 RX ORDER — FAMOTIDINE 40 MG/1
40 TABLET, FILM COATED ORAL DAILY
Qty: 30 TABLET | Refills: 2 | Status: SHIPPED | OUTPATIENT
Start: 2023-08-09

## 2023-08-09 NOTE — PROGRESS NOTES
CC: Prenatal visit    Alondra Perez is a 27 y.o.  at 35w2d.  Doing well.  Denies contractions, LOF, or VB.  Reports good FM.    Reflux-desires trial of Pepcid  - Discussed Flinstones vitamins as not tolerating regular PNV and low iron    /68   Wt 82.6 kg (182 lb 3.2 oz)   LMP 2022 (Exact Date)   BMI 32.28 kg/mý   SVE: deferred  Fundal Height (cm): 35 cm  Fetal Heart Rate: 130    A/P: Alondra Perez is a 27 y.o.  at 35w2d.  - RTC in 1 weeks, GBS at that time  - Trial Flinstone vitamins (x2 of kids tabs) daily as gummy PNV without iron  - Reviewed COVID-19 visitation policy  - Reviewed COVID-19 precautions    Problem List Items Addressed This Visit          Genitourinary and Reproductive     History of loop electrical excision procedure (LEEP)       Gravid and     Short interval between pregnancies affecting pregnancy in second trimester, antepartum    Renal abnormality of fetus on prenatal ultrasound - Primary    Overview     Lt kidney pelviectasis on : Left kidney contained multiple noncommunicating cysts. 2 larger cysts (largest 14.7mm) and at least one smaller cyst was seen. Repeat US in 3 weeks for subopts and evaluation of kidney.     : preliminary US: small single cyst of the left kidney.     : kidneys are normal in appearance. [facility] recommends serial growth scans         Supervision of normal pregnancy    Overview     Low risk NIPT, male fetus            Hematology and Neoplasia    Maternal anemia in pregnancy, antepartum    Overview     Hgb 8.61/Hct 26.9. Referral to hematology for iron infusions pending approval.  Continue iron pills.                Diagnosis Plan   1. Renal abnormality of fetus on prenatal ultrasound        2. Encounter for supervision of other normal pregnancy in second trimester        3. Maternal anemia in pregnancy, antepartum        4. Short interval between pregnancies affecting pregnancy in second trimester, antepartum        5.  History of loop electrical excision procedure (LEEP)          Brooke Ordonez DO  8/20/2023  21:35 CDT

## 2023-08-17 ENCOUNTER — ROUTINE PRENATAL (OUTPATIENT)
Dept: OBSTETRICS AND GYNECOLOGY | Facility: CLINIC | Age: 28
End: 2023-08-17
Payer: OTHER GOVERNMENT

## 2023-08-17 ENCOUNTER — LAB (OUTPATIENT)
Dept: LAB | Facility: HOSPITAL | Age: 28
End: 2023-08-17
Payer: OTHER GOVERNMENT

## 2023-08-17 VITALS — DIASTOLIC BLOOD PRESSURE: 72 MMHG | BODY MASS INDEX: 32.2 KG/M2 | SYSTOLIC BLOOD PRESSURE: 108 MMHG | WEIGHT: 181.8 LBS

## 2023-08-17 DIAGNOSIS — Z3A.36 36 WEEKS GESTATION OF PREGNANCY: Primary | ICD-10-CM

## 2023-08-17 DIAGNOSIS — O99.019 MATERNAL ANEMIA IN PREGNANCY, ANTEPARTUM: ICD-10-CM

## 2023-08-17 DIAGNOSIS — N89.8 VAGINAL ODOR: ICD-10-CM

## 2023-08-17 DIAGNOSIS — O09.892 SHORT INTERVAL BETWEEN PREGNANCIES AFFECTING PREGNANCY IN SECOND TRIMESTER, ANTEPARTUM: ICD-10-CM

## 2023-08-17 DIAGNOSIS — Z34.82 ENCOUNTER FOR SUPERVISION OF OTHER NORMAL PREGNANCY IN SECOND TRIMESTER: ICD-10-CM

## 2023-08-17 DIAGNOSIS — Z36.85 ANTENATAL SCREENING FOR STREPTOCOCCUS B: ICD-10-CM

## 2023-08-17 DIAGNOSIS — Z98.890 HISTORY OF LOOP ELECTRICAL EXCISION PROCEDURE (LEEP): ICD-10-CM

## 2023-08-17 PROCEDURE — 82728 ASSAY OF FERRITIN: CPT

## 2023-08-17 PROCEDURE — 83540 ASSAY OF IRON: CPT | Performed by: NURSE PRACTITIONER

## 2023-08-17 PROCEDURE — 85027 COMPLETE CBC AUTOMATED: CPT | Performed by: NURSE PRACTITIONER

## 2023-08-17 PROCEDURE — 87653 STREP B DNA AMP PROBE: CPT | Performed by: NURSE PRACTITIONER

## 2023-08-17 PROCEDURE — 84466 ASSAY OF TRANSFERRIN: CPT | Performed by: NURSE PRACTITIONER

## 2023-08-17 PROCEDURE — 0352U HC INF DIS BACTERIAL VAGINOSIS AND VAGINITIS AMPLIFIED PROBE TECHNIQUE: CPT | Performed by: NURSE PRACTITIONER

## 2023-08-17 NOTE — PROGRESS NOTES
CC: Prenatal visit    Alondra Perez is a 27 y.o.  at 36w3d.  Doing well.  Denies N/V, dysuria, abnormal vaginal d/c, headaches, heartburn, constipation, cramping, regular contractions, LOF, or VB.  Reports good FM. Pt feels her vaginal pH balance is off and complains of slight odor.     Pregnancy complicated by anemia and I had ordered a referral to hematology for iron infusions. [facility]. She   Pt states [facility] to discuss the iron infusions but pt does not know if [facility] tried calling [facility] or [facility]. Pt last spoke to Dr. Ordonez about this at her last visit. I recommend pt to [facility] with [facility] at this time and ensure they speak with hematology.  Pt continues to take one tablet daily. Will recheck labs today. She does reports some fatigue.     /72   Wt 82.5 kg (181 lb 12.8 oz)   LMP 2022 (Exact Date)   BMI 32.20 kg/mý   SVE: Deferred as pt uncomfortable with GBS swab collection.      Fetal Heart Rate: 132    US preliminary ([facility] recommend serial growths): Cephalic. SEBASTIAN 11.08cm DVP 3.99cm. BPP . EFW 2510 gm (5lb 9oz) 14%ile, AC 14%ile. Pt states she is scheduled next for US on  for GS and BPP.     Pregnancy Problems (from 23 to present)       Problem Noted Resolved    Renal abnormality of fetus on prenatal ultrasound 2023 by Tonya Larry APRN No    Overview Addendum 2023 11:44 AM by Tonya Larry APRN     Lt kidney pelviectasis on : Left kidney contained multiple noncommunicating cysts. 2 larger cysts (largest 14.7mm) and at least one smaller cyst was seen. Repeat US in 3 weeks for subopts and evaluation of kidney.     : preliminary US: small single cyst of the left kidney.     : kidneys are normal in appearance. [facility] recommends serial growth scans         Supervision of normal pregnancy 2023 by Tonya Larry APRN No     Overview Signed 2023 12:55 PM by Tonya Larry APRN     Low risk NIPT, male fetus         Maternal anemia in pregnancy, antepartum 3/16/2023 by Tonya Larry APRN No    Overview Addendum 2023 12:49 PM by Tonya Larry APRN     Hgb 8.61/Hct 26.9. Referral to hematology for iron infusions pending approval.  Continue iron pills.          Short interval between pregnancies affecting pregnancy in second trimester, antepartum 3/3/2023 by Tonya Larry APRN No    History of loop electrical excision procedure (LEEP) 3/3/2023 by Tonya Larry APRN No    Maternal care for breech presentation, single gestation 2023 by Tonya Larry APRN 2023 by Tonya Larry APRN            A/P: Alondra Perez is a 27 y.o.  at 36w3d.  - Reassuring GS today, continue serial GS as recommended by [facility]. Next US  (3 days past her EVARISTO)  - Reviewed labor precautions  - RTC in 1 week       Diagnosis Plan   1. 36 weeks gestation of pregnancy        2.  screening for streptococcus B  Group B Strep (Molecular) - Swab, Vaginal/Rectum      3. Encounter for supervision of other normal pregnancy in second trimester        4. Maternal anemia in pregnancy, antepartum  CBC (No Diff)    Ferritin    Iron Profile      5. Short interval between pregnancies affecting pregnancy in second trimester, antepartum        6. History of loop electrical excision procedure (LEEP)        7. Vaginal odor  MVP Vaginosis Panel - Swab, Vagina    MVP Vaginosis Panel - Swab, Vagina        DREW Arreguin  2023  17:41 CDT

## 2023-08-18 DIAGNOSIS — O99.019 MATERNAL ANEMIA IN PREGNANCY, ANTEPARTUM: ICD-10-CM

## 2023-08-18 LAB
BACTERIAL VAGINOSIS VAG-IMP: NEGATIVE
CANDIDA DNA VAG QL NAA+PROBE: NOT DETECTED
CANDIDA DNA VAG QL NAA+PROBE: NOT DETECTED
DEPRECATED RDW RBC AUTO: 42.2 FL (ref 37–54)
ERYTHROCYTE [DISTWIDTH] IN BLOOD BY AUTOMATED COUNT: 21.8 % (ref 12.3–15.4)
FERRITIN SERPL-MCNC: 13.5 NG/ML (ref 13–150)
HCT VFR BLD AUTO: 26.7 % (ref 34–46.6)
HGB BLD-MCNC: 8.3 G/DL (ref 12–15.9)
IRON 24H UR-MRATE: 184 MCG/DL (ref 37–145)
IRON SATN MFR SERPL: 27 % (ref 20–50)
MCH RBC QN AUTO: 18.9 PG (ref 26.6–33)
MCHC RBC AUTO-ENTMCNC: 31.1 G/DL (ref 31.5–35.7)
MCV RBC AUTO: 61 FL (ref 79–97)
PLATELET # BLD AUTO: 279 10*3/MM3 (ref 140–450)
PMV BLD AUTO: ABNORMAL FL
RBC # BLD AUTO: 4.38 10*6/MM3 (ref 3.77–5.28)
T VAGINALIS DNA VAG QL NAA+PROBE: NOT DETECTED
TIBC SERPL-MCNC: 679 MCG/DL (ref 298–536)
TRANSFERRIN SERPL-MCNC: 456 MG/DL (ref 200–360)
WBC NRBC COR # BLD: 7.23 10*3/MM3 (ref 3.4–10.8)

## 2023-08-19 LAB — GROUP B STREP, DNA: POSITIVE

## 2023-08-21 DIAGNOSIS — Z3A.31 31 WEEKS GESTATION OF PREGNANCY: ICD-10-CM

## 2023-08-21 DIAGNOSIS — O99.019 MATERNAL ANEMIA IN PREGNANCY, ANTEPARTUM: ICD-10-CM

## 2023-08-21 DIAGNOSIS — O35.EXX0 RENAL ABNORMALITY OF FETUS ON PRENATAL ULTRASOUND: ICD-10-CM

## 2023-08-21 DIAGNOSIS — O09.892 SHORT INTERVAL BETWEEN PREGNANCIES AFFECTING PREGNANCY IN SECOND TRIMESTER, ANTEPARTUM: ICD-10-CM

## 2023-08-28 ENCOUNTER — ROUTINE PRENATAL (OUTPATIENT)
Dept: OBSTETRICS AND GYNECOLOGY | Facility: CLINIC | Age: 28
End: 2023-08-28
Payer: OTHER GOVERNMENT

## 2023-08-28 VITALS — WEIGHT: 184 LBS | SYSTOLIC BLOOD PRESSURE: 130 MMHG | DIASTOLIC BLOOD PRESSURE: 80 MMHG | BODY MASS INDEX: 32.59 KG/M2

## 2023-08-28 DIAGNOSIS — O09.892 SHORT INTERVAL BETWEEN PREGNANCIES AFFECTING PREGNANCY IN SECOND TRIMESTER, ANTEPARTUM: ICD-10-CM

## 2023-08-28 DIAGNOSIS — O99.019 MATERNAL ANEMIA IN PREGNANCY, ANTEPARTUM: ICD-10-CM

## 2023-08-28 DIAGNOSIS — O35.EXX0 RENAL ABNORMALITY OF FETUS ON PRENATAL ULTRASOUND: ICD-10-CM

## 2023-08-28 DIAGNOSIS — Z3A.38 38 WEEKS GESTATION OF PREGNANCY: Primary | ICD-10-CM

## 2023-08-28 DIAGNOSIS — Z98.890 HISTORY OF LOOP ELECTRICAL EXCISION PROCEDURE (LEEP): ICD-10-CM

## 2023-08-28 DIAGNOSIS — Z34.82 ENCOUNTER FOR SUPERVISION OF OTHER NORMAL PREGNANCY IN SECOND TRIMESTER: ICD-10-CM

## 2023-08-28 NOTE — PROGRESS NOTES
CC: Prenatal visit    Alondra Perez is a 27 y.o.  at 38w0d. Pt woke up with a headache and thought it was from being dehydrated. Headache is now gone. She is just tired today and was stressing coming in to her appt as she was late and she was carrying her toddler. She is also hurting from her baby moving around a lot.  Initial B/P 158/96,  with repeat at 130/80.  Denies N/V, dysuria, abnormal vaginal d/c, heartburn, constipation, cramping, regular contractions, LOF, or VB, RUQ pain, vision changes.  Reports good FM.     /80   Wt 83.5 kg (184 lb)   LMP 2022 (Exact Date)   BMI 32.59 kg/mý   SVE: Deferred  Fundal Height (cm): 38 cm  Fetal Heart Rate: 150    Pregnancy Problems (from 23 to present)       Problem Noted Resolved    Renal abnormality of fetus on prenatal ultrasound 2023 by Tonya Larry APRN No    Overview Addendum 2023 11:44 AM by Tonya Larry APRN     Lt kidney pelviectasis on : Left kidney contained multiple noncommunicating cysts. 2 larger cysts (largest 14.7mm) and at least one smaller cyst was seen. Repeat US in 3 weeks for subopts and evaluation of kidney.     : preliminary US: small single cyst of the left kidney.     : kidneys are normal in appearance. [facility] recommends serial growth scans         Supervision of normal pregnancy 2023 by Tonya Larry APRN No    Overview Signed 2023 12:55 PM by Tonya Larry APRN     Low risk NIPT, male fetus         Maternal anemia in pregnancy, antepartum 3/16/2023 by Tonya Larry APRN No    Overview Addendum 2023 12:49 PM by Tonya Larry APRN     Hgb 8.61/Hct 26.9. Referral to hematology for iron infusions pending approval.  Continue iron pills.          Short interval between pregnancies affecting pregnancy in second trimester, antepartum 3/3/2023 by Krupinski, Tonya, APRN No    History of loop electrical excision procedure (LEEP) 3/3/2023 by Kendy,  DREW Castaneda No    Maternal care for breech presentation, single gestation 2023 by Tonya Larry APRN 2023 by Tonya Larry APRN            A/P: Alondra Perez is a 27 y.o.  at 38w0d.  - Pt to check B/P twice a day; has a B/P machine at home. Counseled to go to L&D if she has a B/P of 140/90 or higher. Discussed diagnostic criteria for GHTN.   - RTC Thursday for OB visit and close f/u for B/P.        Diagnosis Plan   1. 38 weeks gestation of pregnancy        2. Renal abnormality of fetus on prenatal ultrasound        3. Encounter for supervision of other normal pregnancy in second trimester        4. Maternal anemia in pregnancy, antepartum        5. Short interval between pregnancies affecting pregnancy in second trimester, antepartum        6. History of loop electrical excision procedure (LEEP)          DREW Arreguin  2023  13:06 CDT

## 2023-08-31 ENCOUNTER — ROUTINE PRENATAL (OUTPATIENT)
Dept: OBSTETRICS AND GYNECOLOGY | Facility: CLINIC | Age: 28
End: 2023-08-31
Payer: OTHER GOVERNMENT

## 2023-08-31 ENCOUNTER — TELEPHONE (OUTPATIENT)
Dept: OBSTETRICS AND GYNECOLOGY | Facility: CLINIC | Age: 28
End: 2023-08-31

## 2023-08-31 ENCOUNTER — HOSPITAL ENCOUNTER (INPATIENT)
Facility: HOSPITAL | Age: 28
LOS: 2 days | Discharge: HOME | End: 2023-09-03
Attending: OBSTETRICS & GYNECOLOGY | Admitting: OBSTETRICS & GYNECOLOGY
Payer: OTHER GOVERNMENT

## 2023-08-31 VITALS — DIASTOLIC BLOOD PRESSURE: 88 MMHG | SYSTOLIC BLOOD PRESSURE: 142 MMHG | WEIGHT: 187 LBS | BODY MASS INDEX: 33.13 KG/M2

## 2023-08-31 DIAGNOSIS — O99.019 MATERNAL ANEMIA IN PREGNANCY, ANTEPARTUM: ICD-10-CM

## 2023-08-31 DIAGNOSIS — O35.EXX0 RENAL ABNORMALITY OF FETUS ON PRENATAL ULTRASOUND: ICD-10-CM

## 2023-08-31 DIAGNOSIS — Z3A.38 38 WEEKS GESTATION OF PREGNANCY: Primary | ICD-10-CM

## 2023-08-31 DIAGNOSIS — Z34.82 ENCOUNTER FOR SUPERVISION OF OTHER NORMAL PREGNANCY IN SECOND TRIMESTER: ICD-10-CM

## 2023-08-31 DIAGNOSIS — O09.892 SHORT INTERVAL BETWEEN PREGNANCIES AFFECTING PREGNANCY IN SECOND TRIMESTER, ANTEPARTUM: ICD-10-CM

## 2023-08-31 DIAGNOSIS — Z98.890 HISTORY OF LOOP ELECTRICAL EXCISION PROCEDURE (LEEP): ICD-10-CM

## 2023-08-31 LAB
ALBUMIN SERPL-MCNC: 3 G/DL (ref 3.5–5.2)
ALBUMIN/GLOB SERPL: 0.9 G/DL
ALP SERPL-CCNC: 121 U/L (ref 39–117)
ALT SERPL W P-5'-P-CCNC: 7 U/L (ref 1–33)
AMPHET+METHAMPHET UR QL: NEGATIVE
AMPHETAMINES UR QL: NEGATIVE
ANION GAP SERPL CALCULATED.3IONS-SCNC: 13 MMOL/L (ref 5–15)
AST SERPL-CCNC: 19 U/L (ref 1–32)
BARBITURATES UR QL SCN: NEGATIVE
BENZODIAZ UR QL SCN: NEGATIVE
BILIRUB SERPL-MCNC: 0.3 MG/DL (ref 0–1.2)
BILIRUB UR QL STRIP: NEGATIVE
BUN SERPL-MCNC: 9 MG/DL (ref 6–20)
BUN/CREAT SERPL: 15 (ref 7–25)
BUPRENORPHINE SERPL-MCNC: NEGATIVE NG/ML
CALCIUM SPEC-SCNC: 8.8 MG/DL (ref 8.6–10.5)
CANNABINOIDS SERPL QL: NEGATIVE
CHLORIDE SERPL-SCNC: 101 MMOL/L (ref 98–107)
CLARITY UR: CLEAR
CO2 SERPL-SCNC: 20 MMOL/L (ref 22–29)
COCAINE UR QL: NEGATIVE
COLOR UR: YELLOW
CREAT SERPL-MCNC: 0.6 MG/DL (ref 0.57–1)
DEPRECATED RDW RBC AUTO: 45.6 FL (ref 37–54)
EGFRCR SERPLBLD CKD-EPI 2021: 124.8 ML/MIN/1.73
ERYTHROCYTE [DISTWIDTH] IN BLOOD BY AUTOMATED COUNT: 21.9 % (ref 12.3–15.4)
FENTANYL UR-MCNC: NEGATIVE NG/ML
GLOBULIN UR ELPH-MCNC: 3.5 GM/DL
GLUCOSE SERPL-MCNC: 131 MG/DL (ref 65–99)
GLUCOSE UR STRIP-MCNC: NEGATIVE MG/DL
HCT VFR BLD AUTO: 25.2 % (ref 34–46.6)
HGB BLD-MCNC: 7.6 G/DL (ref 12–15.9)
HGB UR QL STRIP.AUTO: NEGATIVE
KETONES UR QL STRIP: NEGATIVE
LEUKOCYTE ESTERASE UR QL STRIP.AUTO: NEGATIVE
Lab: NORMAL
MCH RBC QN AUTO: 18.4 PG (ref 26.6–33)
MCHC RBC AUTO-ENTMCNC: 30.2 G/DL (ref 31.5–35.7)
MCV RBC AUTO: 60.9 FL (ref 79–97)
METHADONE UR QL SCN: NEGATIVE
NITRITE UR QL STRIP: NEGATIVE
OPIATES UR QL: NEGATIVE
OXYCODONE UR QL SCN: NEGATIVE
PCP UR QL SCN: NEGATIVE
PH UR STRIP.AUTO: 5.5 [PH] (ref 5–9)
PLATELET # BLD AUTO: 251 10*3/MM3 (ref 140–450)
PMV BLD AUTO: 10.3 FL (ref 6–12)
POTASSIUM SERPL-SCNC: 3.3 MMOL/L (ref 3.5–5.2)
PROPOXYPH UR QL: NEGATIVE
PROT SERPL-MCNC: 6.5 G/DL (ref 6–8.5)
PROT UR QL STRIP: NEGATIVE
RBC # BLD AUTO: 4.14 10*6/MM3 (ref 3.77–5.28)
SODIUM SERPL-SCNC: 134 MMOL/L (ref 136–145)
SP GR UR STRIP: 1.01 (ref 1–1.03)
TRICYCLICS UR QL SCN: NEGATIVE
UROBILINOGEN UR QL STRIP: NORMAL
WBC NRBC COR # BLD: 7.68 10*3/MM3 (ref 3.4–10.8)

## 2023-08-31 PROCEDURE — 59025 FETAL NON-STRESS TEST: CPT | Performed by: OBSTETRICS & GYNECOLOGY

## 2023-08-31 PROCEDURE — 80307 DRUG TEST PRSMV CHEM ANLYZR: CPT | Performed by: OBSTETRICS & GYNECOLOGY

## 2023-08-31 PROCEDURE — 86850 RBC ANTIBODY SCREEN: CPT | Performed by: OBSTETRICS & GYNECOLOGY

## 2023-08-31 PROCEDURE — 36415 COLL VENOUS BLD VENIPUNCTURE: CPT | Performed by: OBSTETRICS & GYNECOLOGY

## 2023-08-31 PROCEDURE — 82570 ASSAY OF URINE CREATININE: CPT | Performed by: OBSTETRICS & GYNECOLOGY

## 2023-08-31 PROCEDURE — 85027 COMPLETE CBC AUTOMATED: CPT | Performed by: OBSTETRICS & GYNECOLOGY

## 2023-08-31 PROCEDURE — 80053 COMPREHEN METABOLIC PANEL: CPT | Performed by: OBSTETRICS & GYNECOLOGY

## 2023-08-31 PROCEDURE — 81003 URINALYSIS AUTO W/O SCOPE: CPT | Performed by: OBSTETRICS & GYNECOLOGY

## 2023-08-31 PROCEDURE — 86901 BLOOD TYPING SEROLOGIC RH(D): CPT | Performed by: OBSTETRICS & GYNECOLOGY

## 2023-08-31 PROCEDURE — 84156 ASSAY OF PROTEIN URINE: CPT | Performed by: OBSTETRICS & GYNECOLOGY

## 2023-08-31 PROCEDURE — 86923 COMPATIBILITY TEST ELECTRIC: CPT

## 2023-08-31 PROCEDURE — 86900 BLOOD TYPING SEROLOGIC ABO: CPT | Performed by: OBSTETRICS & GYNECOLOGY

## 2023-08-31 PROCEDURE — 59025 FETAL NON-STRESS TEST: CPT

## 2023-08-31 NOTE — PROGRESS NOTES
CC: Prenatal visit    Alondra Perez is a 27 y.o.  at 38w3d.  Doing well.  Denies N/V, dysuria, abnormal vaginal d/c, headaches, heartburn, constipation, cramping, regular contractions, LOF, or VB.  Reports good FM. Reports blood pressures at home have been 140s-150s/80-90s since being instructed to check her B/Ps at home. Initial B/P in clinic today 142/88, repeat by DREW 140/80.     /88   Wt 84.8 kg (187 lb)   LMP 2022 (Exact Date)   BMI 33.13 kg/mý   SVE: Deferred     Fetal Heart Rate: 120    Pregnancy Problems (from 23 to present)       Problem Noted Resolved    Renal abnormality of fetus on prenatal ultrasound 2023 by Tonya Larry APRN No    Overview Addendum 2023 11:44 AM by Tonya Larry APRN     Lt kidney pelviectasis on : Left kidney contained multiple noncommunicating cysts. 2 larger cysts (largest 14.7mm) and at least one smaller cyst was seen. Repeat US in 3 weeks for subopts and evaluation of kidney.     : preliminary US: small single cyst of the left kidney.     : kidneys are normal in appearance. [facility] recommends serial growth scans         Supervision of normal pregnancy 2023 by Tonya Larry APRN No    Overview Signed 2023 12:55 PM by Tonya Larry APRN     Low risk NIPT, male fetus         Maternal anemia in pregnancy, antepartum 3/16/2023 by Tonya Larry APRN No    Overview Addendum 2023  2:31 PM by Tonya Larry APRN     Hgb 8.61/Hct 26.9. Referral to hematology for iron infusions pending approval.  Continue iron pills.     [facility] but pt was already at 37 weeks pregnant. Deferred iron infusions at this time.         Short interval between pregnancies affecting pregnancy in second trimester, antepartum 3/3/2023 by Tonya Larry APRN No    History of loop electrical excision procedure (LEEP) 3/3/2023 by Tonya Larry, DREW No    Maternal care for breech  clear presentation, single gestation 2023 by Tonya Larry APRN 2023 by Tonya Larry APRN            A/P: Alondra Perez is a 27 y.o.  at 38w3d.  - Instructed to go to L&D to be evaluated for GHTN and possible IOL. Pt and spouse needing to grab their belongings at home first and will present to L&D in the next 1-1.5hr.   - GBS positive  - Dr. Palma notified.        Diagnosis Plan   1. 38 weeks gestation of pregnancy        2. Renal abnormality of fetus on prenatal ultrasound        3. Encounter for supervision of other normal pregnancy in second trimester        4. Maternal anemia in pregnancy, antepartum        5. Short interval between pregnancies affecting pregnancy in second trimester, antepartum        6. History of loop electrical excision procedure (LEEP)          DREW Arreguin  2023  14:31 CDT

## 2023-08-31 NOTE — LETTER
August 31, 2023     Patient: Alondra Perez   YOB: 1995   Date of Visit: 8/31/2023       To Whom It May Concern:     Alondra Perez  was seen in the clinic 8/31/2023 .            Sincerely,        DREW Arreguin

## 2023-08-31 NOTE — TELEPHONE ENCOUNTER
PATIENT CALLED AND IS NEEDING TO SPEAK TO YOU ALL. SHE SAW AMANDA TODAY IN [place]. HER NUMBER -[phone].        THANKS,      ARIADNA

## 2023-09-01 ENCOUNTER — ANESTHESIA (OUTPATIENT)
Dept: LABOR AND DELIVERY | Facility: HOSPITAL | Age: 28
End: 2023-09-01
Payer: OTHER GOVERNMENT

## 2023-09-01 ENCOUNTER — ANESTHESIA EVENT (OUTPATIENT)
Dept: LABOR AND DELIVERY | Facility: HOSPITAL | Age: 28
End: 2023-09-01
Payer: OTHER GOVERNMENT

## 2023-09-01 PROBLEM — O09.899 SHORT INTERVAL BETWEEN PREGNANCIES AFFECTING PREGNANCY, ANTEPARTUM: Status: ACTIVE | Noted: 2023-03-03

## 2023-09-01 PROBLEM — O13.3 GESTATIONAL HYPERTENSION, THIRD TRIMESTER: Status: ACTIVE | Noted: 2023-09-01

## 2023-09-01 PROBLEM — O09.93 SUPERVISION OF HIGH RISK PREGNANCY IN THIRD TRIMESTER: Status: ACTIVE | Noted: 2023-04-27

## 2023-09-01 PROBLEM — Z37.9 NORMAL LABOR: Status: ACTIVE | Noted: 2023-09-01

## 2023-09-01 PROBLEM — Z37.9 VACUUM-ASSISTED VAGINAL DELIVERY: Status: ACTIVE | Noted: 2023-09-01

## 2023-09-01 LAB
ABO GROUP BLD: NORMAL
BLD GP AB SCN SERPL QL: NEGATIVE
CREAT UR-MCNC: 58.9 MG/DL
PROT ?TM UR-MCNC: 6.4 MG/DL
PROT/CREAT UR: 108.7 MG/G CREA (ref 0–200)
RH BLD: POSITIVE
T&S EXPIRATION DATE: NORMAL

## 2023-09-01 PROCEDURE — 25010000002 LABETALOL 5 MG/ML SOLUTION

## 2023-09-01 PROCEDURE — 0 PENICILLIN G POTASSIUM PER 600000 UNITS: Performed by: OBSTETRICS & GYNECOLOGY

## 2023-09-01 PROCEDURE — C1755 CATHETER, INTRASPINAL: HCPCS | Performed by: NURSE ANESTHETIST, CERTIFIED REGISTERED

## 2023-09-01 PROCEDURE — 3E033VJ INTRODUCTION OF OTHER HORMONE INTO PERIPHERAL VEIN, PERCUTANEOUS APPROACH: ICD-10-PCS | Performed by: STUDENT IN AN ORGANIZED HEALTH CARE EDUCATION/TRAINING PROGRAM

## 2023-09-01 PROCEDURE — 88307 TISSUE EXAM BY PATHOLOGIST: CPT

## 2023-09-01 PROCEDURE — 25010000002 MORPHINE PER 10 MG: Performed by: OBSTETRICS & GYNECOLOGY

## 2023-09-01 PROCEDURE — 25010000002 TERBUTALINE PER 1 MG: Performed by: OBSTETRICS & GYNECOLOGY

## 2023-09-01 PROCEDURE — S0260 H&P FOR SURGERY: HCPCS | Performed by: OBSTETRICS & GYNECOLOGY

## 2023-09-01 PROCEDURE — 25010000002 BUPIVACAINE (PF) 0.25 % SOLUTION: Performed by: NURSE ANESTHETIST, CERTIFIED REGISTERED

## 2023-09-01 PROCEDURE — 59400 OBSTETRICAL CARE: CPT | Performed by: STUDENT IN AN ORGANIZED HEALTH CARE EDUCATION/TRAINING PROGRAM

## 2023-09-01 PROCEDURE — 94799 UNLISTED PULMONARY SVC/PX: CPT

## 2023-09-01 PROCEDURE — 0 MAGNESIUM SULFATE 20 GM/500ML SOLUTION: Performed by: STUDENT IN AN ORGANIZED HEALTH CARE EDUCATION/TRAINING PROGRAM

## 2023-09-01 RX ORDER — OXYTOCIN/0.9 % SODIUM CHLORIDE 30/500 ML
250 PLASTIC BAG, INJECTION (ML) INTRAVENOUS CONTINUOUS
Status: DISCONTINUED | OUTPATIENT
Start: 2023-09-01 | End: 2023-09-01

## 2023-09-01 RX ORDER — TERBUTALINE SULFATE 1 MG/ML
0.25 INJECTION, SOLUTION SUBCUTANEOUS ONCE
Status: COMPLETED | OUTPATIENT
Start: 2023-09-01 | End: 2023-09-01

## 2023-09-01 RX ORDER — NIFEDIPINE 10 MG/1
10-20 CAPSULE ORAL
Status: DISCONTINUED | OUTPATIENT
Start: 2023-09-01 | End: 2023-09-03 | Stop reason: HOSPADM

## 2023-09-01 RX ORDER — MISOPROSTOL 200 UG/1
800 TABLET ORAL ONCE AS NEEDED
Status: DISCONTINUED | OUTPATIENT
Start: 2023-09-01 | End: 2023-09-02

## 2023-09-01 RX ORDER — OXYTOCIN/0.9 % SODIUM CHLORIDE 30/500 ML
999 PLASTIC BAG, INJECTION (ML) INTRAVENOUS ONCE
Status: DISCONTINUED | OUTPATIENT
Start: 2023-09-01 | End: 2023-09-02

## 2023-09-01 RX ORDER — MISOPROSTOL 200 UG/1
400 TABLET ORAL ONCE
Status: COMPLETED | OUTPATIENT
Start: 2023-09-01 | End: 2023-09-01

## 2023-09-01 RX ORDER — ONDANSETRON 4 MG/1
4 TABLET, FILM COATED ORAL EVERY 6 HOURS PRN
Status: DISCONTINUED | OUTPATIENT
Start: 2023-09-01 | End: 2023-09-03 | Stop reason: HOSPADM

## 2023-09-01 RX ORDER — LIDOCAINE HCL/EPINEPHRINE/PF 2%-1:200K
VIAL (ML) INJECTION AS NEEDED
Status: DISCONTINUED | OUTPATIENT
Start: 2023-09-01 | End: 2023-09-01 | Stop reason: SURG

## 2023-09-01 RX ORDER — ONDANSETRON 2 MG/ML
4 INJECTION INTRAMUSCULAR; INTRAVENOUS EVERY 6 HOURS PRN
Status: DISCONTINUED | OUTPATIENT
Start: 2023-09-01 | End: 2023-09-03 | Stop reason: HOSPADM

## 2023-09-01 RX ORDER — ONDANSETRON 2 MG/ML
4 INJECTION INTRAMUSCULAR; INTRAVENOUS EVERY 6 HOURS PRN
Status: DISCONTINUED | OUTPATIENT
Start: 2023-09-01 | End: 2023-09-01 | Stop reason: SDUPTHER

## 2023-09-01 RX ORDER — SODIUM CHLORIDE 0.9 % (FLUSH) 0.9 %
10 SYRINGE (ML) INJECTION AS NEEDED
Status: DISCONTINUED | OUTPATIENT
Start: 2023-09-01 | End: 2023-09-02

## 2023-09-01 RX ORDER — SODIUM CHLORIDE, SODIUM LACTATE, POTASSIUM CHLORIDE, CALCIUM CHLORIDE 600; 310; 30; 20 MG/100ML; MG/100ML; MG/100ML; MG/100ML
125 INJECTION, SOLUTION INTRAVENOUS CONTINUOUS
Status: DISCONTINUED | OUTPATIENT
Start: 2023-09-01 | End: 2023-09-02

## 2023-09-01 RX ORDER — SODIUM CHLORIDE 9 MG/ML
40 INJECTION, SOLUTION INTRAVENOUS AS NEEDED
Status: DISCONTINUED | OUTPATIENT
Start: 2023-09-01 | End: 2023-09-02

## 2023-09-01 RX ORDER — MISOPROSTOL 100 MCG
50 TABLET ORAL EVERY 4 HOURS
Status: DISCONTINUED | OUTPATIENT
Start: 2023-09-01 | End: 2023-09-01

## 2023-09-01 RX ORDER — MORPHINE SULFATE 2 MG/ML
1 INJECTION, SOLUTION INTRAMUSCULAR; INTRAVENOUS EVERY 4 HOURS PRN
Status: DISCONTINUED | OUTPATIENT
Start: 2023-09-01 | End: 2023-09-02

## 2023-09-01 RX ORDER — MORPHINE SULFATE 2 MG/ML
2 INJECTION, SOLUTION INTRAMUSCULAR; INTRAVENOUS EVERY 4 HOURS PRN
Status: DISCONTINUED | OUTPATIENT
Start: 2023-09-01 | End: 2023-09-02

## 2023-09-01 RX ORDER — LABETALOL HYDROCHLORIDE 5 MG/ML
INJECTION, SOLUTION INTRAVENOUS
Status: COMPLETED
Start: 2023-09-01 | End: 2023-09-01

## 2023-09-01 RX ORDER — SODIUM CHLORIDE 0.9 % (FLUSH) 0.9 %
10 SYRINGE (ML) INJECTION EVERY 12 HOURS SCHEDULED
Status: DISCONTINUED | OUTPATIENT
Start: 2023-09-01 | End: 2023-09-02

## 2023-09-01 RX ORDER — DOCUSATE SODIUM 100 MG/1
100 CAPSULE, LIQUID FILLED ORAL 2 TIMES DAILY
Status: DISCONTINUED | OUTPATIENT
Start: 2023-09-02 | End: 2023-09-03 | Stop reason: HOSPADM

## 2023-09-01 RX ORDER — BUPIVACAINE HYDROCHLORIDE 2.5 MG/ML
INJECTION, SOLUTION EPIDURAL; INFILTRATION; INTRACAUDAL AS NEEDED
Status: DISCONTINUED | OUTPATIENT
Start: 2023-09-01 | End: 2023-09-01 | Stop reason: SURG

## 2023-09-01 RX ORDER — FERROUS SULFATE TAB EC 324 MG (65 MG FE EQUIVALENT) 324 (65 FE) MG
324 TABLET DELAYED RESPONSE ORAL 2 TIMES DAILY WITH MEALS
Status: DISCONTINUED | OUTPATIENT
Start: 2023-09-02 | End: 2023-09-03 | Stop reason: HOSPADM

## 2023-09-01 RX ORDER — CALCIUM CARBONATE 500 MG/1
2 TABLET, CHEWABLE ORAL 3 TIMES DAILY PRN
Status: DISCONTINUED | OUTPATIENT
Start: 2023-09-01 | End: 2023-09-03 | Stop reason: HOSPADM

## 2023-09-01 RX ORDER — BISACODYL 10 MG
10 SUPPOSITORY, RECTAL RECTAL DAILY PRN
Status: DISCONTINUED | OUTPATIENT
Start: 2023-09-02 | End: 2023-09-03 | Stop reason: HOSPADM

## 2023-09-01 RX ORDER — MAGNESIUM SULFATE HEPTAHYDRATE 40 MG/ML
INJECTION, SOLUTION INTRAVENOUS
Status: COMPLETED
Start: 2023-09-01 | End: 2023-09-01

## 2023-09-01 RX ORDER — MAGNESIUM SULFATE HEPTAHYDRATE 40 MG/ML
2 INJECTION, SOLUTION INTRAVENOUS CONTINUOUS
Status: DISCONTINUED | OUTPATIENT
Start: 2023-09-01 | End: 2023-09-03 | Stop reason: HOSPADM

## 2023-09-01 RX ORDER — FAMOTIDINE 20 MG/1
40 TABLET, FILM COATED ORAL DAILY
Status: DISCONTINUED | OUTPATIENT
Start: 2023-09-02 | End: 2023-09-03 | Stop reason: HOSPADM

## 2023-09-01 RX ORDER — ONDANSETRON 4 MG/1
4 TABLET, FILM COATED ORAL EVERY 6 HOURS PRN
Status: DISCONTINUED | OUTPATIENT
Start: 2023-09-01 | End: 2023-09-01 | Stop reason: SDUPTHER

## 2023-09-01 RX ORDER — IBUPROFEN 600 MG/1
600 TABLET ORAL EVERY 6 HOURS PRN
Status: DISCONTINUED | OUTPATIENT
Start: 2023-09-01 | End: 2023-09-03 | Stop reason: HOSPADM

## 2023-09-01 RX ORDER — METHYLERGONOVINE MALEATE 0.2 MG/ML
200 INJECTION INTRAVENOUS ONCE AS NEEDED
Status: DISCONTINUED | OUTPATIENT
Start: 2023-09-01 | End: 2023-09-02

## 2023-09-01 RX ORDER — HYDRALAZINE HYDROCHLORIDE 20 MG/ML
5-10 INJECTION INTRAMUSCULAR; INTRAVENOUS
Status: DISCONTINUED | OUTPATIENT
Start: 2023-09-01 | End: 2023-09-03 | Stop reason: HOSPADM

## 2023-09-01 RX ORDER — SODIUM CHLORIDE 0.9 % (FLUSH) 0.9 %
1-10 SYRINGE (ML) INJECTION AS NEEDED
Status: DISCONTINUED | OUTPATIENT
Start: 2023-09-01 | End: 2023-09-03 | Stop reason: HOSPADM

## 2023-09-01 RX ORDER — CARBOPROST TROMETHAMINE 250 UG/ML
250 INJECTION, SOLUTION INTRAMUSCULAR
Status: DISCONTINUED | OUTPATIENT
Start: 2023-09-01 | End: 2023-09-02

## 2023-09-01 RX ORDER — OXYTOCIN/0.9 % SODIUM CHLORIDE 30/500 ML
2 PLASTIC BAG, INJECTION (ML) INTRAVENOUS
Status: DISCONTINUED | OUTPATIENT
Start: 2023-09-01 | End: 2023-09-02

## 2023-09-01 RX ORDER — PRENATAL VIT/IRON FUM/FOLIC AC 27MG-0.8MG
1 TABLET ORAL DAILY
Status: DISCONTINUED | OUTPATIENT
Start: 2023-09-02 | End: 2023-09-03 | Stop reason: HOSPADM

## 2023-09-01 RX ORDER — LABETALOL HYDROCHLORIDE 5 MG/ML
20-80 INJECTION, SOLUTION INTRAVENOUS
Status: DISCONTINUED | OUTPATIENT
Start: 2023-09-01 | End: 2023-09-03 | Stop reason: HOSPADM

## 2023-09-01 RX ORDER — ACETAMINOPHEN 325 MG/1
650 TABLET ORAL EVERY 6 HOURS PRN
Status: DISCONTINUED | OUTPATIENT
Start: 2023-09-01 | End: 2023-09-03 | Stop reason: HOSPADM

## 2023-09-01 RX ORDER — HYDROCORTISONE 25 MG/G
1 CREAM TOPICAL AS NEEDED
Status: DISCONTINUED | OUTPATIENT
Start: 2023-09-01 | End: 2023-09-03 | Stop reason: HOSPADM

## 2023-09-01 RX ADMIN — SODIUM CHLORIDE, POTASSIUM CHLORIDE, SODIUM LACTATE AND CALCIUM CHLORIDE 125 ML/HR: 600; 310; 30; 20 INJECTION, SOLUTION INTRAVENOUS at 19:50

## 2023-09-01 RX ADMIN — MORPHINE SULFATE 1 MG: 2 INJECTION, SOLUTION INTRAMUSCULAR; INTRAVENOUS at 08:26

## 2023-09-01 RX ADMIN — MISOPROSTOL 400 MCG: 200 TABLET ORAL at 20:21

## 2023-09-01 RX ADMIN — BUPIVACAINE HYDROCHLORIDE 10 ML: 2.5 INJECTION, SOLUTION EPIDURAL; INFILTRATION; INTRACAUDAL; PERINEURAL at 13:44

## 2023-09-01 RX ADMIN — Medication 2 MILLI-UNITS/MIN: at 06:39

## 2023-09-01 RX ADMIN — LABETALOL HYDROCHLORIDE 20 MG: 5 INJECTION, SOLUTION INTRAVENOUS at 17:30

## 2023-09-01 RX ADMIN — SODIUM CHLORIDE 3 MILLION UNITS: 900 INJECTION, SOLUTION INTRAVENOUS at 17:47

## 2023-09-01 RX ADMIN — Medication 50 MCG: at 01:15

## 2023-09-01 RX ADMIN — SODIUM CHLORIDE 3 MILLION UNITS: 900 INJECTION, SOLUTION INTRAVENOUS at 13:53

## 2023-09-01 RX ADMIN — IBUPROFEN 600 MG: 600 TABLET, FILM COATED ORAL at 23:42

## 2023-09-01 RX ADMIN — SODIUM CHLORIDE 3 MILLION UNITS: 900 INJECTION, SOLUTION INTRAVENOUS at 09:10

## 2023-09-01 RX ADMIN — SODIUM CHLORIDE 5 MILLION UNITS: 900 INJECTION, SOLUTION INTRAVENOUS at 00:48

## 2023-09-01 RX ADMIN — TERBUTALINE SULFATE 0.25 MG: 1 INJECTION, SOLUTION SUBCUTANEOUS at 03:10

## 2023-09-01 RX ADMIN — MAGNESIUM SULFATE HEPTAHYDRATE 4 G: 40 INJECTION, SOLUTION INTRAVENOUS at 17:40

## 2023-09-01 RX ADMIN — ACETAMINOPHEN 325 MG: 325 TABLET, FILM COATED ORAL at 23:42

## 2023-09-01 RX ADMIN — BUPIVACAINE HYDROCHLORIDE 8 ML: 2.5 INJECTION, SOLUTION EPIDURAL; INFILTRATION; INTRACAUDAL; PERINEURAL at 19:24

## 2023-09-01 RX ADMIN — MAGNESIUM SULFATE IN WATER 2 G/HR: 40 INJECTION, SOLUTION INTRAVENOUS at 18:13

## 2023-09-01 RX ADMIN — LIDOCAINE HYDROCHLORIDE,EPINEPHRINE BITARTRATE 3 ML: 20; .005 INJECTION, SOLUTION EPIDURAL; INFILTRATION; INTRACAUDAL; PERINEURAL at 13:37

## 2023-09-01 RX ADMIN — SODIUM CHLORIDE, POTASSIUM CHLORIDE, SODIUM LACTATE AND CALCIUM CHLORIDE 125 ML/HR: 600; 310; 30; 20 INJECTION, SOLUTION INTRAVENOUS at 00:46

## 2023-09-01 RX ADMIN — SODIUM CHLORIDE, POTASSIUM CHLORIDE, SODIUM LACTATE AND CALCIUM CHLORIDE 125 ML/HR: 600; 310; 30; 20 INJECTION, SOLUTION INTRAVENOUS at 07:43

## 2023-09-01 RX ADMIN — SODIUM CHLORIDE 3 MILLION UNITS: 900 INJECTION, SOLUTION INTRAVENOUS at 05:00

## 2023-09-01 NOTE — PROGRESS NOTES
Progress Update Note    4/80-90/-3  Getting more comfortable with epidural. Few lates, will decrease pitocin, reposition, fluid bolus prn. CEFM. Otherwise appropriate baseline and reactive. Cat I-II.

## 2023-09-01 NOTE — ANESTHESIA PREPROCEDURE EVALUATION
Anesthesia Evaluation     Patient summary reviewed and Nursing notes reviewed   NPO Solid Status: > 4 hours  NPO Liquid Status: < 2 hours           Airway   Mallampati: II  No difficulty expected  Dental - normal exam     Pulmonary - negative pulmonary ROS and normal exam   Cardiovascular     Rhythm: regular  Rate: normal    (+) hypertension poorly controlled      Neuro/Psych- negative ROS  GI/Hepatic/Renal/Endo - negative ROS     Musculoskeletal (-) negative ROS    Abdominal    Substance History - negative use     OB/GYN    (+) Pregnant        Other          Other Comment: Anemia   ROS/Med Hx Other: Hgb 7.6  Hct 25.2                  Anesthesia Plan    ASA 2     epidural       Anesthetic plan, risks, benefits, and alternatives have been provided, discussed and informed consent has been obtained with: patient.    Plan discussed with CRNA.      CODE STATUS:    Level Of Support Discussed With: Patient  Code Status (Patient has no pulse and is not breathing): CPR (Attempt to Resuscitate)  Medical Interventions (Patient has pulse or is breathing): Full Support

## 2023-09-01 NOTE — H&P
[facility]  HISTORY & PHYSICAL - Obstetrics    Name: Alondra Perez  MRN: [id number]  Location: L770  Date: 2023   CSN: [id number]      CHIEF COMPLAINT:  Elevated BP, sent from clinic    HISTORY OF PRESENT ILLNESS  Alondra Perez is a 27 y.o.  at 38w4d who presented late last night after being seen in clinic earlier in the day and was sent to L&D (patient was seen at 2PM and was told  to be here within 1.5 hours; she arrived at approximately 10PM).  In clinic, she had a mild range BP and she had had one the week before but was attributed to stress.  Denies HA, vision changes, or RUQ pain.  Denies LO, vaginal bleeding, or contraction.  Reports good FM.    Patient denies any chest pain, palpitations, headaches, lightheadedness, shortness of breath, cough, nausea, vomiting, diarrhea, constipation, fever, or chills.    ROS  Review of Systems   Constitutional: Negative.    HENT: Negative.     Eyes: Negative.    Respiratory: Negative.     Cardiovascular: Negative.    Gastrointestinal: Negative.    Endocrine: Negative.    Genitourinary: Negative.    Musculoskeletal: Negative.    Skin: Negative.    Allergic/Immunologic: Negative.    Neurological: Negative.    Hematological: Negative.    Psychiatric/Behavioral: Negative.       PRENATAL LAB RESULTS  Prenatal labs reviewed  External Prenatal Results       Pregnancy Outside Results - Transcribed From Office Records - See Scanned Records For Details       Test Value Date Time    ABO  O  23    Rh  Positive  23    Antibody Screen  Negative  23       Negative  23 1413    Varicella IgG       Rubella  4.04 index 23 1413    Hgb  7.6 g/dL 23       8.3 g/dL 23 1154       8.6 g/dL 23 1051       10.4 g/dL 23 1413    Hct  25.2 % 23       26.7 % 23 1154       26.9 % 23 1051       32.1 % 23 1413    Glucose Fasting GTT       Glucose Tolerance Test 1  hour       Glucose Tolerance Test 3 hour       Gonorrhea (discrete)  Negative  03/02/23 1413    Chlamydia (discrete)  Negative  03/02/23 1413    RPR  Non-Reactive  03/02/23 1413    VDRL       Syphilis Antibody       HBsAg  Non-Reactive  03/02/23 1413    Herpes Simplex Virus PCR       Herpes Simplex VIrus Culture       HIV  Non-Reactive  03/02/23 1413    Hep C RNA Quant PCR       Hep C Antibody  Non-Reactive  03/02/23 1413    AFP       Group B Strep  Positive  08/17/23 1111    GBS Susceptibility to Clindamycin       GBS Susceptibility to Erythromycin       Fetal Fibronectin       Genetic Testing, Maternal Blood                 Drug Screening       Test Value Date Time    Urine Drug Screen       Amphetamine Screen  Negative  08/31/23 2201       Negative  03/02/23 1413    Barbiturate Screen  Negative  08/31/23 2201       Negative  03/02/23 1413    Benzodiazepine Screen  Negative  08/31/23 2201       Negative  03/02/23 1413    Methadone Screen  Negative  08/31/23 2201       Negative  03/02/23 1413    Phencyclidine Screen  Negative  08/31/23 2201       Negative  03/02/23 1413    Opiates Screen  Negative  08/31/23 2201       Negative  03/02/23 1413    THC Screen  Negative  08/31/23 2201       Negative  03/02/23 1413    Cocaine Screen       Propoxyphene Screen  Negative  08/31/23 2201       Negative  03/02/23 1413    Buprenorphine Screen  Negative  08/31/23 2201       Negative  03/02/23 1413    Methamphetamine Screen       Oxycodone Screen  Negative  08/31/23 2201       Negative  03/02/23 1413    Tricyclic Antidepressants Screen  Negative  08/31/23 2201       Negative  03/02/23 1413              Legend    ^: Historical                          PRENATAL RISK FACTORS  Pregnancy Problems (from 03/02/23 to present)       Problem Noted Resolved    Gestational hypertension, third trimester 9/1/2023 by Griselda Palma MD No    Renal abnormality of fetus on prenatal ultrasound 4/27/2023 by Tonya Larry APRN No     Overview Addendum 2023 11:44 AM by Tonya Larry APRN     Lt kidney pelviectasis on : Left kidney contained multiple noncommunicating cysts. 2 larger cysts (largest 14.7mm) and at least one smaller cyst was seen. Repeat US in 3 weeks for subopts and evaluation of kidney.     : preliminary US: small single cyst of the left kidney.     : kidneys are normal in appearance. [facility] recommends serial growth scans         Supervision of high risk pregnancy in third trimester 2023 by Tonya Larry APRN No    Overview Signed 2023 12:55 PM by Tonya Larry APRN     Low risk NIPT, male fetus         Maternal anemia in pregnancy, antepartum 3/16/2023 by Tonya Larry APRN No    Overview Addendum 2023  2:31 PM by Tonya Larry APRN     Hgb 8.61/Hct 26.9. Referral to hematology for iron infusions pending approval.  Continue iron pills.     [facility] but pt was already at 37 weeks pregnant. Deferred iron infusions at this time.         Short interval between pregnancies affecting pregnancy, antepartum 3/3/2023 by Tonya Larry APRN No    History of loop electrical excision procedure (LEEP) 3/3/2023 by Tonya Larry APRN No    Maternal care for breech presentation, single gestation 2023 by Tonya Larry APRN 2023 by Tonya Larry APRN          OB HISTORY  OB History    Para Term  AB Living   2 1 1     1   SAB IAB Ectopic Molar Multiple Live Births             1      # Outcome Date GA Lbr Gil/2nd Weight Sex Delivery Anes PTL Lv   2 Current            1 Term 22 37w5d  2750 g (6 lb 1 oz) M Vag-Spont EPI  SILVERIO     PAST MEDICAL HISTORY  Past Medical History:   Diagnosis Date    Chlamydia     Gonorrhea      PAST SURGICAL HISTORY  Past Surgical History:   Procedure Laterality Date    WISDOM TOOTH EXTRACTION       FAMILY HISTORY  Family History   Problem Relation Age of Onset    No Known Problems  Mother     No Known Problems Father      SOCIAL HISTORY  Social History     Socioeconomic History    Marital status: Legally    Tobacco Use    Smoking status: Never   Vaping Use    Vaping Use: Never used   Substance and Sexual Activity    Alcohol use: Never    Drug use: Never    Sexual activity: Yes     Partners: Male     ALLERGIES  No Known Allergies    HOME MEDICATIONS  Prior to Admission medications    Medication Sig Start Date End Date Taking? Authorizing Provider   ferrous sulfate 325 (65 FE) MG tablet Take 1 tablet by mouth Daily With Breakfast.   Yes Provider, MD Ministerio   Prenatal MV & Min w/FA-DHA (PRENATAL ADULT GUMMY/DHA/FA PO) Take  by mouth.   Yes Provider, Ministerio, MD   famotidine (Pepcid) 40 MG tablet Take 1 tablet by mouth Daily. 23   Brooke Ordonez DO   ondansetron (Zofran) 4 MG tablet Take 1 tablet by mouth Every 8 (Eight) Hours As Needed for Nausea or Vomiting.  Patient not taking: Reported on 2023  Tonya Larry APRN   promethazine (PHENERGAN) 25 MG tablet Take 1 tablet by mouth Every 6 (Six) Hours As Needed for Nausea or Vomiting. 23   Tonya Larry APRN       PHYSICAL EXAM  /89   Pulse 69   Temp 98.3 °F (36.8 °C) (Oral)   Resp 20   LMP 2022 (Exact Date)   SpO2 98%   Breastfeeding No   General: No acute distress. Well developed, well nourished. Pleasant.  Heart: Regular rate and rhythm. No murmurs, rubs, or gallops  Lungs: Clear to auscultation bilaterally. No wheezes, rales, or rhonchi.  Abdomen: Soft, nontender to palpation, enlarged by gravid uterus.    NST Review  Indication: GHTN  FHT: Baseline 130 bpm, moderate variability, pos accelerations, neg decelerations.  [place]:  Regular contractions every 4-6 minutes.  Impression: FHT Cat 1     SVE per RN: 0-1/ 10/ -3, vertex    IMPRESSION  Alondra Perez is a 27 y.o.  at 38w4d admitted with GHTN; she was given 1 dose of Cytotec and had recurrent decelerations  requiring terbutaline.  Will plan for pitocin induction.    PLAN  1.  IUP at 38w4d with GHTN  - Admit: Labor and Delivery  - Attending: Dr. Palma  - Condition: Stable  - Vitals: per protocol  - Activity: ad vinicio  - Nursing: Continuous electronic fetal monitoring, as per protocol  - Diet: Clears  - IV fluids:  mL/hr  - Meds: Pitocin  - Allergies: NKDA  - Labs: CBC, T&S, UDS  - GBS: POS.  Antibiotics: PCN  - Alondra Perez and I have discussed pain goals for this hospitalization after reviewing her current clinical condition, medical history and prior pain experiences.  The goal is to keep her pain level appropriate.  Patient may have epidural if desired.  - Anticipate     2.  Profound maternal anemia  - Hgb 7.6 on admission  - 2 units pRBCs on hold    This document has been electronically signed by Griselda Palma MD on 2023 05:49 CDT.

## 2023-09-01 NOTE — ANESTHESIA PROCEDURE NOTES
Labor Epidural      Patient reassessed immediately prior to procedure    Patient location during procedure: OB  Indication:at surgeon's request  Performed By  CRNA/HARISH: Brian Hayward CRNA  Preanesthetic Checklist  Completed: patient identified, IV checked, site marked, risks and benefits discussed, surgical consent, monitors and equipment checked, pre-op evaluation and timeout performed  Prep:  Pt Position:sitting  Sterile Tech:cap, gloves, mask and sterile barrier  Prep:chlorhexidine gluconate and isopropyl alcohol  Monitoring:blood pressure monitoring and continuous pulse oximetry  Epidural Block Procedure:  Approach:midline  Guidance:landmark technique  Location:L4-L5  Needle Type:Tuohy  Needle Gauge:17 G  Loss of Resistance Medium: saline  Loss of Resistance: 7cm  Cath Depth at skin:13 cm  Paresthesia: none  Aspiration:negative  Test Dose:negative  Number of Attempts: 1  Post Assessment:  Dressing:occlusive dressing applied and secured with tape  Pt Tolerance:patient tolerated the procedure well with no apparent complications  Complications:no

## 2023-09-01 NOTE — PROGRESS NOTES
Patient with intermittent, now persistent severe range BPs. No HA, CP, SOA, RUQ pain. Discussed previously and nurse again discussed plan for Magnesium for seizure ppx with 4g load and 2g/hr. Will start Labetalol protocol.         This document has been electronically signed by Brooke Ordonez DO on September 1, 2023 17:28 CDT

## 2023-09-01 NOTE — PLAN OF CARE
Problem: Adult Inpatient Plan of Care  Goal: Plan of Care Review  9/1/2023 0703 by Mone Renee RN  Outcome: Ongoing, Progressing  Flowsheets (Taken 9/1/2023 0703)  Progress: improving  Plan of Care Reviewed With: patient  Outcome Evaluation: VSS, came in as outpatient but is now admitted for induction of labor, last cervical check is 1/20-30/-3, pitocin was just started, pain controlled  9/1/2023 0703 by Mone Renee RN  Outcome: Ongoing, Progressing  Flowsheets (Taken 9/1/2023 0703)  Progress: improving  Plan of Care Reviewed With: patient  Outcome Evaluation: VSS, came in as outpatient but is now admitted for induction of labor, last cervical check is 1/20-30/-3, pitocin was just started, pain controlled  Goal: Patient-Specific Goal (Individualized)  9/1/2023 0703 by Mone Renee RN  Outcome: Ongoing, Progressing  9/1/2023 0703 by Mone Renee RN  Outcome: Ongoing, Progressing  Goal: Absence of Hospital-Acquired Illness or Injury  9/1/2023 0703 by Mone Renee RN  Outcome: Ongoing, Progressing  9/1/2023 0703 by Mone Renee RN  Outcome: Ongoing, Progressing  Intervention: Identify and Manage Fall Risk  Recent Flowsheet Documentation  Taken 9/1/2023 0130 by Mone Renee RN  Safety Promotion/Fall Prevention:   assistive device/personal items within reach   clutter free environment maintained  Intervention: Prevent and Manage VTE (Venous Thromboembolism) Risk  Recent Flowsheet Documentation  Taken 9/1/2023 0130 by Mone Renee RN  VTE Prevention/Management: (ambulates) patient refused intervention  Intervention: Prevent Infection  Recent Flowsheet Documentation  Taken 9/1/2023 0130 by Mone Renee RN  Infection Prevention:   visitors restricted/screened   single patient room provided   rest/sleep promoted  Goal: Optimal Comfort and Wellbeing  9/1/2023 0703 by Mone Renee RN  Outcome: Ongoing, Progressing  9/1/2023 0703 by Mone Renee RN  Outcome: Ongoing,  Progressing  Intervention: Provide Person-Centered Care  Recent Flowsheet Documentation  Taken 9/1/2023 0130 by Mone Renee, RN  Trust Relationship/Rapport:   care explained   choices provided   emotional support provided   empathic listening provided   questions answered   questions encouraged   reassurance provided   thoughts/feelings acknowledged  Goal: Readiness for Transition of Care  9/1/2023 0703 by Mone Renee, RN  Outcome: Ongoing, Progressing  9/1/2023 0703 by Mone Renee RN  Outcome: Ongoing, Progressing  Intervention: Mutually Develop Transition Plan  Recent Flowsheet Documentation  Taken 9/1/2023 0057 by Mone Renee, RN  Equipment Currently Used at Home: none  Taken 9/1/2023 0053 by Mone Renee RN  Transportation Anticipated: family or friend will provide  Patient/Family Anticipated Services at Transition: none  Patient/Family Anticipates Transition to: home with family     Problem: Bleeding (Labor)  Goal: Hemostasis  Outcome: Ongoing, Progressing     Problem: Change in Fetal Wellbeing (Labor)  Goal: Stable Fetal Wellbeing  Outcome: Ongoing, Progressing     Problem: Delayed Labor Progression (Labor)  Goal: Effective Progression to Delivery  Outcome: Ongoing, Progressing     Problem: Infection (Labor)  Goal: Absence of Infection Signs and Symptoms  Outcome: Ongoing, Progressing  Intervention: Prevent or Manage Infection  Recent Flowsheet Documentation  Taken 9/1/2023 0130 by Mone Renee RN  Infection Prevention:   visitors restricted/screened   single patient room provided   rest/sleep promoted     Problem: Labor Pain (Labor)  Goal: Acceptable Pain Control  Outcome: Ongoing, Progressing     Problem: Uterine Tachysystole (Labor)  Goal: Normal Uterine Contraction Pattern  Outcome: Ongoing, Progressing   Goal Outcome Evaluation:  Plan of Care Reviewed With: patient        Progress: improving  Outcome Evaluation: VSS, came in as outpatient but is now admitted for induction of  labor, last cervical check is 1/20-30/-3, pitocin was just started, pain controlled

## 2023-09-01 NOTE — NON STRESS TEST
Alondra Perez, a  at 38w3d with an EVARISTO of 2023, by Last Menstrual Period, was seen at [facility] LABOR DELIVERY for a nonstress test.    Chief Complaint   Patient presents with    Elevated Blood Pressure       Patient Active Problem List   Diagnosis    Short interval between pregnancies affecting pregnancy in second trimester, antepartum    History of loop electrical excision procedure (LEEP)    Maternal anemia in pregnancy, antepartum    Renal abnormality of fetus on prenatal ultrasound    Supervision of normal pregnancy       Start Time:   Stop Time:     Interpretation A  Nonstress Test Interpretation A: Reactive  Comments A: Reviewed with SANDEEP Henderson RN

## 2023-09-02 LAB
DEPRECATED RDW RBC AUTO: 45.1 FL (ref 37–54)
ERYTHROCYTE [DISTWIDTH] IN BLOOD BY AUTOMATED COUNT: 21.7 % (ref 12.3–15.4)
HCT VFR BLD AUTO: 25.8 % (ref 34–46.6)
HGB BLD-MCNC: 7.9 G/DL (ref 12–15.9)
MCH RBC QN AUTO: 18.6 PG (ref 26.6–33)
MCHC RBC AUTO-ENTMCNC: 30.6 G/DL (ref 31.5–35.7)
MCV RBC AUTO: 60.8 FL (ref 79–97)
PLATELET # BLD AUTO: 239 10*3/MM3 (ref 140–450)
RBC # BLD AUTO: 4.24 10*6/MM3 (ref 3.77–5.28)
WBC NRBC COR # BLD: 9.62 10*3/MM3 (ref 3.4–10.8)

## 2023-09-02 PROCEDURE — 51702 INSERT TEMP BLADDER CATH: CPT

## 2023-09-02 PROCEDURE — 51703 INSERT BLADDER CATH COMPLEX: CPT

## 2023-09-02 PROCEDURE — 85027 COMPLETE CBC AUTOMATED: CPT | Performed by: STUDENT IN AN ORGANIZED HEALTH CARE EDUCATION/TRAINING PROGRAM

## 2023-09-02 PROCEDURE — C1755 CATHETER, INTRASPINAL: HCPCS

## 2023-09-02 PROCEDURE — 0503F POSTPARTUM CARE VISIT: CPT | Performed by: STUDENT IN AN ORGANIZED HEALTH CARE EDUCATION/TRAINING PROGRAM

## 2023-09-02 PROCEDURE — 0 MAGNESIUM SULFATE 20 GM/500ML SOLUTION: Performed by: STUDENT IN AN ORGANIZED HEALTH CARE EDUCATION/TRAINING PROGRAM

## 2023-09-02 RX ORDER — ASCORBIC ACID 500 MG
500 TABLET ORAL
Status: DISCONTINUED | OUTPATIENT
Start: 2023-09-03 | End: 2023-09-03 | Stop reason: HOSPADM

## 2023-09-02 RX ORDER — LABETALOL 200 MG/1
200 TABLET, FILM COATED ORAL EVERY 12 HOURS SCHEDULED
Status: DISCONTINUED | OUTPATIENT
Start: 2023-09-02 | End: 2023-09-03 | Stop reason: HOSPADM

## 2023-09-02 RX ADMIN — MAGNESIUM HYDROXIDE 10 ML: 2400 SUSPENSION ORAL at 19:46

## 2023-09-02 RX ADMIN — PRENATAL VIT W/ FE FUMARATE-FA TAB 27-0.8 MG 1 TABLET: 27-0.8 TAB at 11:26

## 2023-09-02 RX ADMIN — FERROUS SULFATE TAB EC 324 MG (65 MG FE EQUIVALENT) 324 MG: 324 (65 FE) TABLET DELAYED RESPONSE at 11:34

## 2023-09-02 RX ADMIN — MAGNESIUM SULFATE IN WATER 2 G/HR: 40 INJECTION, SOLUTION INTRAVENOUS at 02:17

## 2023-09-02 RX ADMIN — Medication: at 02:17

## 2023-09-02 RX ADMIN — DOCUSATE SODIUM 100 MG: 100 CAPSULE, LIQUID FILLED ORAL at 11:34

## 2023-09-02 RX ADMIN — LABETALOL HYDROCHLORIDE 200 MG: 200 TABLET, FILM COATED ORAL at 11:25

## 2023-09-02 RX ADMIN — MAGNESIUM SULFATE IN WATER 2 G/HR: 40 INJECTION, SOLUTION INTRAVENOUS at 13:27

## 2023-09-02 RX ADMIN — FERROUS SULFATE TAB EC 324 MG (65 MG FE EQUIVALENT) 324 MG: 324 (65 FE) TABLET DELAYED RESPONSE at 21:56

## 2023-09-02 RX ADMIN — LABETALOL HYDROCHLORIDE 200 MG: 200 TABLET, FILM COATED ORAL at 21:56

## 2023-09-02 RX ADMIN — DOCUSATE SODIUM 100 MG: 100 CAPSULE, LIQUID FILLED ORAL at 21:56

## 2023-09-02 NOTE — PROGRESS NOTES
.  [facility]  Vaginal Delivery Progress Note    Subjective   Postpartum Day 1: Vaginal Delivery    Pt is doing well this morning without any problems or concerns overnight.  She is currently on MgSO4 for seizure prophylaxis.  She is tolerating a regular diet without nausea or vomiting.  Pain is well controlled with pain medications given.  She admits to light vaginal bleeding. She is ambulating without shortness of breath or dizziness.  She is breast feeding without complications.  She denies HA/BV, breast pain, chest pain, diarrhea, constipation, edema, or muscle weakness.       Objective     Vital Signs Range for the last 24 hours  Temperature: Temp:  [97.7 °F (36.5 °C)-98.4 °F (36.9 °C)] 98.2 °F (36.8 °C)   Temp Source: Temp src: Oral   BP: BP: (120-179)/() 139/81   Pulse: Heart Rate:  [] 88   Respirations: Resp:  [16-18] 18   SPO2: SpO2:  [92 %-100 %] 99 %   O2 Amount (l/min):     O2 Devices Device (Oxygen Therapy): room air   Weight:       Admit Height:         Physical Exam:  General:  no acute distress.  Abdomen: abdomen is soft without significant tenderness, masses.   Fundus: appropriate, firm, non tender  Extremities: normal, atraumatic, no cyanosis, and trace edema. 2+ DTRs      Lab results reviewed:  Yes   Rubella:  No results found for: RUBELLAIGGIN Nurse Transcribed from prenatal record --    Rubella Antibodies, IgG   Date Value Ref Range Status   03/02/2023 4.04 Immune >0.99 index Final     Comment:                                     Non-immune       <0.90                                  Equivocal  0.90 - 0.99                                  Immune           >0.99     Rh Status:    RH type   Date Value Ref Range Status   08/31/2023 Positive  Final     Immunizations:   Immunization History   Administered Date(s) Administered   • Tdap 06/29/2023       Assessment & Plan       Gestational hypertension, third trimester    Short interval between pregnancies affecting pregnancy,  antepartum    History of loop electrical excision procedure (LEEP)    Maternal anemia in pregnancy, antepartum    Renal abnormality of fetus on prenatal ultrasound    Supervision of high risk pregnancy in third trimester    Vacuum-assisted vaginal delivery    Category II fetal heart rate tracing during labor and delivery      Alondra Perez is a 26yo F, , s/p a VAVD at 38 4/7 weeks gestation.  Pregnancy c/b Pre-E w/ SF.  Rh+/Nadia/GBSpos.    Plan:     1) Continue routine postpartum care:  - Diet: Regular  - Pain: Ibuprofen prn; Tylenol prn  - DVT prophylaxis: SCDs    2) Pre-E w/ SF:  - -150s/70-80s  - Start Labetalol 200mg BID  - Continue MgSO4 x24hrs postpartum for seizure prophlyaxis  - Monitor for signs and/or symptoms of Mg toxicity    3) Acute of chronic anemia secondary to acute blood loss:  - Hct 25.2 --> 25.8  - Continue Ferrous sulfate QD; Start Vitamin C QD    4) Disposition:  - Anticipate discharge to home PPD#2          Tara Trevino MD  2023  11:44 CDT

## 2023-09-02 NOTE — ANESTHESIA POSTPROCEDURE EVALUATION
Patient: Alondra Perez    Procedure Summary       Date: 09/01/23 Room / Location:     Anesthesia Start: 1321 Anesthesia Stop: 2012    Procedure: LABOR ANALGESIA Diagnosis:     Scheduled Providers:  Provider: Brian Hayward CRNA    Anesthesia Type: epidural ASA Status: 2            Anesthesia Type: epidural    Vitals  Vitals Value Taken Time   /77 09/01/23 2031   Temp 98.1 øF (36.7 øC) 09/01/23 1346   Pulse 96 09/01/23 2038   Resp     SpO2 100 % 09/01/23 2038   Vitals shown include unvalidated device data.        Post Anesthesia Care and Evaluation    Patient location during evaluation: bedside  Patient participation: complete - patient participated  Level of consciousness: awake and alert  Pain score: 0  Pain management: adequate    Airway patency: patent  Anesthetic complications: No anesthetic complications  PONV Status: none  Cardiovascular status: acceptable  Respiratory status: acceptable  Hydration status: acceptable  Post Neuraxial Block status: No signs or symptoms of PDPH  Comments: --------------------            09/01/23 1931     --------------------   BP:       146/82     Pulse:      95       Resp:                Temp:                SpO2:               --------------------

## 2023-09-02 NOTE — L&D DELIVERY NOTE
[facility]   Vaginal Delivery Note    Patient Name: Alondra Perez  : 1995  MRN: [id number]    Date of Delivery: 2023     Diagnosis     Pre & Post-Delivery:  Intrauterine pregnancy at 38w4d  Labor status:      Gestational hypertension, third trimester    Short interval between pregnancies affecting pregnancy, antepartum    History of loop electrical excision procedure (LEEP)    Maternal anemia in pregnancy, antepartum    Renal abnormality of fetus on prenatal ultrasound    Supervision of high risk pregnancy in third trimester    Vacuum-assisted vaginal delivery    Category II fetal heart rate tracing during labor and delivery             Problem List    Transfer to Postpartum     Review the Delivery Report for details.     Delivery     Delivery:      YOB: 2025    Time of Birth:  Gestational Age 8:12 PM   38w4d     Anesthesia:      Delivering clinician:     Forceps?   No   Vacuum? Yes  Vacuum Delivery  Vacuum attempted?      Vacuum indication:     Vacuum type:     Application location:     First Attempt     Time applied:     Time removed:     Second Attempt    Time applied:     Time removed:     Third Attempt    Time applied:     Time removed:     Number of pulls:     Number of pop-offs:     Low-end pressure range:     High-end pressure range:     Total application time:     Applied by:     Failed?         Shoulder dystocia present: No        Delivery narrative:  Patient pushed to deliver a viable 2990g male from the ZACHARIAH position over an intact perineum via VAVD under epidural anesthesia on 2023 at 2012.  The indication for vacuum assisted vaginal delivery was nonreassuring fetal tracing with bradycardia to 60-70's. Verbal consent was obtained and the risks and benefits were discussed with the patient. The patient was found to have adequate pain control with an epidural. She was in the lithotomy position and the bladder was emptied. The patient was felt to have an adequate pelvis  and the cervix was completely dilated. The  was in the vertex presentation, the position was ZACHARIAH, and the station was +2. The EFW of the  was estimated to be 6.5 pounds, no significant caput and/or molding was noted. NICU was made aware of the operative delivery and already present. The vacuum was placed over the sagittal suture 2 cm anterior to the posterior fontanelle along the flexion point. The cup was checked to ensure no entrapment of vaginal or cervical tissue. Vacuum pressure applied to green level, but not higher with all attempts. With maternal push, vacuum extraction was applied along the pelvic curve. 6 pulls were attempted with 0 pop-offs. Infant made continued progress in station during course of operative delivery. Successful vacuum extraction of the  was performed. Double noted nuchal cord relieved prior to delivery of shoulder as was relatively tight.  Left anterior shoulder was delivered with ease, followed by right posterior shoulder.  Body was then delivered with gentle traction.  Mouth and nose bulb suctioned.  3 vessel cord clamped x2 and cut after 30 seconds of delayed clamping.  Baby was placed on mother's chest.  Cord blood was obtained and sent.  Placenta delivered spontaneously intact and Pitocin was started.  Cervix, vagina, and perineum were examined and no laceration was noted.  Initial mild atony improved with bimanual massage, prophylactic Cytotec 400 mcg bucally with significant maternal anemia.  EBL 250mL; QBL pending, please see nursing documentation.  Apgar scores 7/9.  Mother and infant stable.        Infant     Findings: male  infant     Infant observations: Weight: 2990 g (6 lb 9.5 oz)   Length: 19  in  Observations/Comments:        Apgars: 7  @ 1 minute /    9  @ 5 minutes   Infant Name: Baby Boy     Placenta & Cord         Placenta delivered    at        Cord:   present.   Nuchal Cord?  yes; Number of nuchal loops present:      Cord blood obtained:      Cord gases obtained:      Cord gas results: Venous:  No results found for: PHCVEN    Arterial:  No results found for: PHCART     Repair     Episiotomy: Not recorded     No    Lacerations: No   Estimated Blood Loss:       Quantitative Blood Loss:          Complications     none    Disposition     Mother to Mother Baby/Postpartum  in stable condition currently.  Baby to remains with mom  in stable condition currently.    Brooke Ordonez DO  09/01/23  21:17 CDT

## 2023-09-02 NOTE — PLAN OF CARE
Goal Outcome Evaluation:  Plan of Care Reviewed With: patient        Progress: improving  Outcome Evaluation: VSS. Voids, ambulates. Fundus firm at umblicus. Pain managed with prn tylenol and motrin. Breastfeeding and formula feeding  independently. Magnesium 2g/hr continued.

## 2023-09-02 NOTE — PLAN OF CARE
Problem: Adult Inpatient Plan of Care  Goal: Plan of Care Review  Outcome: Ongoing, Progressing  Flowsheets (Taken 9/2/2023 1604)  Progress: improving  Plan of Care Reviewed With: patient  Outcome Evaluation: Ambulating in room, voids, breast and bottle feeding, mag 2g/hr continued.   Goal Outcome Evaluation:  Plan of Care Reviewed With: patient        Progress: improving  Outcome Evaluation: Ambulating in room, voids, breast and bottle feeding, mag 2g/hr continued.

## 2023-09-03 VITALS
HEART RATE: 98 BPM | SYSTOLIC BLOOD PRESSURE: 127 MMHG | TEMPERATURE: 98.6 F | OXYGEN SATURATION: 98 % | DIASTOLIC BLOOD PRESSURE: 69 MMHG | RESPIRATION RATE: 18 BRPM

## 2023-09-03 PROCEDURE — 0503F POSTPARTUM CARE VISIT: CPT | Performed by: STUDENT IN AN ORGANIZED HEALTH CARE EDUCATION/TRAINING PROGRAM

## 2023-09-03 RX ORDER — LABETALOL 200 MG/1
200 TABLET, FILM COATED ORAL 2 TIMES DAILY
Qty: 60 TABLET | Refills: 2 | Status: SHIPPED | OUTPATIENT
Start: 2023-09-03

## 2023-09-03 RX ORDER — IBUPROFEN 600 MG/1
600 TABLET ORAL EVERY 6 HOURS PRN
Qty: 30 TABLET | Refills: 1 | Status: SHIPPED | OUTPATIENT
Start: 2023-09-03

## 2023-09-03 RX ORDER — DOCUSATE SODIUM 100 MG/1
100 CAPSULE, LIQUID FILLED ORAL DAILY PRN
Qty: 15 CAPSULE | Refills: 1 | Status: SHIPPED | OUTPATIENT
Start: 2023-09-03

## 2023-09-03 RX ORDER — ASCORBIC ACID 500 MG
500 TABLET ORAL DAILY
Qty: 30 TABLET | Refills: 1 | Status: SHIPPED | OUTPATIENT
Start: 2023-09-03

## 2023-09-03 RX ADMIN — OXYCODONE HYDROCHLORIDE AND ACETAMINOPHEN 500 MG: 500 TABLET ORAL at 10:52

## 2023-09-03 RX ADMIN — PRENATAL VIT W/ FE FUMARATE-FA TAB 27-0.8 MG 1 TABLET: 27-0.8 TAB at 08:23

## 2023-09-03 RX ADMIN — LABETALOL HYDROCHLORIDE 200 MG: 200 TABLET, FILM COATED ORAL at 08:23

## 2023-09-03 RX ADMIN — FERROUS SULFATE TAB EC 324 MG (65 MG FE EQUIVALENT) 324 MG: 324 (65 FE) TABLET DELAYED RESPONSE at 08:23

## 2023-09-03 NOTE — DISCHARGE SUMMARY
[facility]  Delivery Discharge Summary    Primary OB Clinician:     EDC: Estimated Date of Delivery: 23    Gestational Age:38w4d    Antepartum complications: Preeclampsia with severe features    Date of Delivery: 2023   Time of Delivery: 8:12 PM     Delivered By:  Brooke Ordonez     Delivery Type: Vaginal, Vacuum (Extractor)      Tubal Ligation: n/a    Baby:male  infant;   Apgar:  7  @ 1 minute /   Apgar:  9  @ 5 minutes   Weight: 2990 g (6 lb 9.5 oz)    Length: 19     Anesthesia: Epidural      Intrapartum complications: None    Laceration: No    Episiotomy: No    Placenta: Spontaneous     Feeding method: Breastfeeding Status: Yes    Rh Immune globulin given: not applicable    Rubella vaccine given: not applicable    Discharge Date: 9/3/2023; Discharge Time: 11:38 CDT    Early Discharge:  NO    Plan:    PPD#2 - Alondra Perez is a 28yo F, , s/p a VAVD at 38 4/7 weeks gestation.  Pregnancy c/b Pre-E w/ SF.  Rh+/Nadia/GBSpos.  Meeting postpartum milestones and stable for discharge.    Plan:  - Pain:  Ibuprofen 600mg Q6hrs prn  - Pre-e w/ SF: Labetalol 200mg BID  - Asymptomatic anemia secondary to acute blood loss: Ferrous sulfate 325mg QD + Vitamin C 500mg QD + Colace 100mg QHS prn  - Breastfeeding: Continue PNV 1 tab QD  - Diet: Regular  - Activity: Shower as often as you like, but avoid tub baths or swimming until after your postpartum checkup. There should be nothing placed in the vagina until after your postpartum checkup. This means no tampons, douching or intercourse (sex).  - Disposition: Discharge to home with Follow-up in 1 week for BP check and 6 weeks for routine postpartum care and contraceptive management        Address and phone number verified and same.  Follow-up appointment in 1 week.

## 2023-09-03 NOTE — NURSING NOTE
"Dr. Trevino stated, \"She is doing great and does not have to wait 24 hours after the magnesium is stopped to be discharged.\"  "

## 2023-09-03 NOTE — DISCHARGE INSTR - APPOINTMENTS
[facility] and schedule a follow-up for a blood pressure check in 1 week and a postpartum follow-up in 6 weeks.

## 2023-09-04 LAB
BH BB BLOOD EXPIRATION DATE: NORMAL
BH BB BLOOD EXPIRATION DATE: NORMAL
BH BB BLOOD TYPE BARCODE: NORMAL
BH BB BLOOD TYPE BARCODE: NORMAL
BH BB DISPENSE STATUS: NORMAL
BH BB DISPENSE STATUS: NORMAL
BH BB PRODUCT CODE: NORMAL
BH BB PRODUCT CODE: NORMAL
BH BB UNIT NUMBER: NORMAL
BH BB UNIT NUMBER: NORMAL
CROSSMATCH INTERPRETATION: NORMAL
CROSSMATCH INTERPRETATION: NORMAL
UNIT  ABO: NORMAL
UNIT  ABO: NORMAL
UNIT  RH: NORMAL
UNIT  RH: NORMAL

## 2023-09-07 LAB — REF LAB TEST METHOD: NORMAL

## 2023-09-10 NOTE — PROGRESS NOTES
"Enter Query Response Below      Query Response:   Was Pre-E with severe features clinically supported?  -Yes, pre-e with severe features is supported               If applicable, please update the problem list.   Patient: Alondra Perez        : 1995  Account: [id number]           Admit Date: 2023        How to Respond to this query:       a. Click New Note     b. Answer query within the yellow box.                c. Update the Problem List, if applicable.    Dr. Trevino,    27 y.o.  at 38w4d who presented after being seen in clinic earlier in the day for a mild range BP. /99 ( 1340.) PN ( 1723) \"No HA, CP, SOA, RUQ pain.\" Mg bolus ( 1740.) /97 ( 2113). Patient delivered a viable infant (2117). /100 ( 2215). Laboratory results during encounter include: Creatinine 0.60 ALT/AST 19/7, Plts 251-239, Alk Phos 121 Urine: Creatinine 58.9, Total Protein 6.4, Protein/Creatinine Ratio: 108.7BP 155/88 ( 1829). The patient received Labetalol (-). Mg infusion (-). PN ( 1144) with “trace edema and Pre-E w/ SF.\" Discharge summary with “Antepartum complications: Preeclampsia with severe features.”    Was Pre-E with severe features clinically supported?  -Yes, pre-e with severe features is supported with additional clinical indicators (specify) _____________  -No, pre-e with severe features not supported. Pre-e without severe features only.   -Other, please specify: __________  -Unable to determine    By submitting this query, we are merely seeking further clarification of documentation to accurately reflect all conditions that you are monitoring, evaluating, treating or that extend the hospitalization or utilize additional resources of care. Please utilize your independent clinical judgment when addressing the question(s) above.     This query and your response, once completed, will be entered into the legal medical record.    Sincerely,  Nicole Perez RN, " [email or web address]  Clinical Documentation Integrity Program